# Patient Record
Sex: FEMALE | Race: WHITE | Employment: OTHER | URBAN - METROPOLITAN AREA
[De-identification: names, ages, dates, MRNs, and addresses within clinical notes are randomized per-mention and may not be internally consistent; named-entity substitution may affect disease eponyms.]

---

## 2017-07-31 ENCOUNTER — APPOINTMENT (OUTPATIENT)
Dept: CT IMAGING | Age: 82
End: 2017-07-31
Attending: EMERGENCY MEDICINE
Payer: MEDICARE

## 2017-07-31 ENCOUNTER — APPOINTMENT (OUTPATIENT)
Dept: GENERAL RADIOLOGY | Age: 82
End: 2017-07-31
Attending: EMERGENCY MEDICINE
Payer: MEDICARE

## 2017-07-31 ENCOUNTER — HOSPITAL ENCOUNTER (EMERGENCY)
Age: 82
Discharge: HOME | End: 2017-07-31
Attending: EMERGENCY MEDICINE
Payer: MEDICARE

## 2017-07-31 VITALS
SYSTOLIC BLOOD PRESSURE: 159 MMHG | BODY MASS INDEX: 29.72 KG/M2 | WEIGHT: 157.41 LBS | RESPIRATION RATE: 20 BRPM | HEIGHT: 61 IN | DIASTOLIC BLOOD PRESSURE: 60 MMHG | OXYGEN SATURATION: 98 % | HEART RATE: 76 BPM

## 2017-07-31 DIAGNOSIS — N30.00 ACUTE CYSTITIS WITHOUT HEMATURIA: Primary | ICD-10-CM

## 2017-07-31 DIAGNOSIS — E16.2 HYPOGLYCEMIA: ICD-10-CM

## 2017-07-31 LAB
ALBUMIN SERPL BCP-MCNC: 2.7 G/DL (ref 3.5–5)
ALBUMIN/GLOB SERPL: 0.7 {RATIO} (ref 1.1–2.2)
ALP SERPL-CCNC: 97 U/L (ref 45–117)
ALT SERPL-CCNC: 16 U/L (ref 12–78)
ANION GAP BLD CALC-SCNC: 6 MMOL/L (ref 5–15)
APPEARANCE UR: ABNORMAL
AST SERPL W P-5'-P-CCNC: 16 U/L (ref 15–37)
BACTERIA URNS QL MICRO: ABNORMAL /HPF
BASOPHILS # BLD AUTO: 0 K/UL (ref 0–0.1)
BASOPHILS # BLD: 0 % (ref 0–1)
BILIRUB SERPL-MCNC: 0.5 MG/DL (ref 0.2–1)
BILIRUB UR QL: NEGATIVE
BUN SERPL-MCNC: 40 MG/DL (ref 6–20)
BUN/CREAT SERPL: 21 (ref 12–20)
CALCIUM SERPL-MCNC: 8.2 MG/DL (ref 8.5–10.1)
CHLORIDE SERPL-SCNC: 101 MMOL/L (ref 97–108)
CK MB CFR SERPL CALC: 5.1 % (ref 0–2.5)
CK MB SERPL-MCNC: 5.2 NG/ML (ref 5–25)
CK SERPL-CCNC: 102 U/L (ref 26–192)
CO2 SERPL-SCNC: 30 MMOL/L (ref 21–32)
COLOR UR: ABNORMAL
CREAT SERPL-MCNC: 1.89 MG/DL (ref 0.55–1.02)
EOSINOPHIL # BLD: 0.1 K/UL (ref 0–0.4)
EOSINOPHIL NFR BLD: 1 % (ref 0–7)
EPITH CASTS URNS QL MICRO: ABNORMAL /LPF
ERYTHROCYTE [DISTWIDTH] IN BLOOD BY AUTOMATED COUNT: 17.1 % (ref 11.5–14.5)
GLOBULIN SER CALC-MCNC: 4 G/DL (ref 2–4)
GLUCOSE BLD STRIP.AUTO-MCNC: 244 MG/DL (ref 65–100)
GLUCOSE SERPL-MCNC: 243 MG/DL (ref 65–100)
GLUCOSE UR STRIP.AUTO-MCNC: NEGATIVE MG/DL
HCT VFR BLD AUTO: 31.3 % (ref 35–47)
HGB BLD-MCNC: 10 G/DL (ref 11.5–16)
HGB UR QL STRIP: ABNORMAL
HYALINE CASTS URNS QL MICRO: ABNORMAL /LPF (ref 0–5)
KETONES UR QL STRIP.AUTO: NEGATIVE MG/DL
LACTATE SERPL-SCNC: 1 MMOL/L (ref 0.4–2)
LEUKOCYTE ESTERASE UR QL STRIP.AUTO: ABNORMAL
LYMPHOCYTES # BLD AUTO: 10 % (ref 12–49)
LYMPHOCYTES # BLD: 1.9 K/UL (ref 0.8–3.5)
MCH RBC QN AUTO: 30.4 PG (ref 26–34)
MCHC RBC AUTO-ENTMCNC: 31.9 G/DL (ref 30–36.5)
MCV RBC AUTO: 95.1 FL (ref 80–99)
MONOCYTES # BLD: 1 K/UL (ref 0–1)
MONOCYTES NFR BLD AUTO: 5 % (ref 5–13)
NEUTS SEG # BLD: 15.2 K/UL (ref 1.8–8)
NEUTS SEG NFR BLD AUTO: 84 % (ref 32–75)
NITRITE UR QL STRIP.AUTO: NEGATIVE
PH UR STRIP: 7 [PH] (ref 5–8)
PLATELET # BLD AUTO: 238 K/UL (ref 150–400)
POTASSIUM SERPL-SCNC: 3.4 MMOL/L (ref 3.5–5.1)
PROT SERPL-MCNC: 6.7 G/DL (ref 6.4–8.2)
PROT UR STRIP-MCNC: ABNORMAL MG/DL
RBC # BLD AUTO: 3.29 M/UL (ref 3.8–5.2)
RBC #/AREA URNS HPF: ABNORMAL /HPF (ref 0–5)
SERVICE CMNT-IMP: ABNORMAL
SODIUM SERPL-SCNC: 137 MMOL/L (ref 136–145)
SP GR UR REFRACTOMETRY: 1.01 (ref 1–1.03)
TROPONIN I SERPL-MCNC: <0.04 NG/ML
UA: UC IF INDICATED,UAUC: ABNORMAL
UROBILINOGEN UR QL STRIP.AUTO: 0.2 EU/DL (ref 0.2–1)
WBC # BLD AUTO: 18.2 K/UL (ref 3.6–11)
WBC URNS QL MICRO: >100 /HPF (ref 0–4)

## 2017-07-31 PROCEDURE — 93005 ELECTROCARDIOGRAM TRACING: CPT

## 2017-07-31 PROCEDURE — 82962 GLUCOSE BLOOD TEST: CPT

## 2017-07-31 PROCEDURE — 80053 COMPREHEN METABOLIC PANEL: CPT | Performed by: EMERGENCY MEDICINE

## 2017-07-31 PROCEDURE — 87077 CULTURE AEROBIC IDENTIFY: CPT | Performed by: EMERGENCY MEDICINE

## 2017-07-31 PROCEDURE — 71010 XR CHEST PORT: CPT

## 2017-07-31 PROCEDURE — 87040 BLOOD CULTURE FOR BACTERIA: CPT | Performed by: EMERGENCY MEDICINE

## 2017-07-31 PROCEDURE — 85025 COMPLETE CBC W/AUTO DIFF WBC: CPT | Performed by: EMERGENCY MEDICINE

## 2017-07-31 PROCEDURE — 74011000258 HC RX REV CODE- 258: Performed by: EMERGENCY MEDICINE

## 2017-07-31 PROCEDURE — 81001 URINALYSIS AUTO W/SCOPE: CPT | Performed by: EMERGENCY MEDICINE

## 2017-07-31 PROCEDURE — 70450 CT HEAD/BRAIN W/O DYE: CPT

## 2017-07-31 PROCEDURE — 96375 TX/PRO/DX INJ NEW DRUG ADDON: CPT

## 2017-07-31 PROCEDURE — 87086 URINE CULTURE/COLONY COUNT: CPT | Performed by: EMERGENCY MEDICINE

## 2017-07-31 PROCEDURE — 82550 ASSAY OF CK (CPK): CPT | Performed by: EMERGENCY MEDICINE

## 2017-07-31 PROCEDURE — 36415 COLL VENOUS BLD VENIPUNCTURE: CPT | Performed by: EMERGENCY MEDICINE

## 2017-07-31 PROCEDURE — 96365 THER/PROPH/DIAG IV INF INIT: CPT

## 2017-07-31 PROCEDURE — 83605 ASSAY OF LACTIC ACID: CPT

## 2017-07-31 PROCEDURE — 99285 EMERGENCY DEPT VISIT HI MDM: CPT

## 2017-07-31 PROCEDURE — 87186 SC STD MICRODIL/AGAR DIL: CPT | Performed by: EMERGENCY MEDICINE

## 2017-07-31 PROCEDURE — 84484 ASSAY OF TROPONIN QUANT: CPT | Performed by: EMERGENCY MEDICINE

## 2017-07-31 PROCEDURE — 74011250636 HC RX REV CODE- 250/636: Performed by: EMERGENCY MEDICINE

## 2017-07-31 PROCEDURE — 83605 ASSAY OF LACTIC ACID: CPT | Performed by: EMERGENCY MEDICINE

## 2017-07-31 RX ORDER — SODIUM CHLORIDE 0.9 % (FLUSH) 0.9 %
5-10 SYRINGE (ML) INJECTION AS NEEDED
Status: DISCONTINUED | OUTPATIENT
Start: 2017-07-31 | End: 2017-08-01 | Stop reason: HOSPADM

## 2017-07-31 RX ORDER — SODIUM CHLORIDE 0.9 % (FLUSH) 0.9 %
5-10 SYRINGE (ML) INJECTION EVERY 8 HOURS
Status: DISCONTINUED | OUTPATIENT
Start: 2017-07-31 | End: 2017-08-01 | Stop reason: HOSPADM

## 2017-07-31 RX ORDER — CEPHALEXIN 500 MG/1
500 CAPSULE ORAL 3 TIMES DAILY
Qty: 21 CAP | Refills: 0 | Status: SHIPPED | OUTPATIENT
Start: 2017-07-31 | End: 2017-08-03

## 2017-07-31 RX ORDER — FUROSEMIDE 10 MG/ML
40 INJECTION INTRAMUSCULAR; INTRAVENOUS
Status: COMPLETED | OUTPATIENT
Start: 2017-07-31 | End: 2017-07-31

## 2017-07-31 RX ADMIN — FUROSEMIDE 40 MG: 10 INJECTION, SOLUTION INTRAMUSCULAR; INTRAVENOUS at 18:45

## 2017-07-31 RX ADMIN — CEFTRIAXONE 1 G: 1 INJECTION, POWDER, FOR SOLUTION INTRAMUSCULAR; INTRAVENOUS at 22:28

## 2017-07-31 NOTE — ED PROVIDER NOTES
HPI Comments: Kristin Viera is a 80 y.o. female who presents from [address] [place] [address] to [facility] with an altered mental status. Per EMS, pt was walking to the lunch room in her nursing facility when she acutely stopped talking or interacting with staff, prompting call for 911. EMS notes that upon arrival, pt was able to open eyes to name, but was not able to follow commands. They state that BGL was 77 mg/dL at that time, and administered 1 amp of D50, but deny any change in condition. Pt nonverbal at time of interview, clenching eyes closed. Shireen Trujillo MD    PMHx: hypothyroidism, CKD, DM, HTN, CAD, HLD  Social Hx: - smoking; - EtOH; - illicit drug use    HPI and ROS are limited secondary to altered mental status. The history is provided by the EMS personnel. The history is limited by the condition of the patient. No  was used. Past Medical History:   Diagnosis Date    CAD (coronary artery disease)     Chronic kidney disease (CKD), stage III (moderate)     GFR ~ 37 at baseline    Diabetes ([facility] [address]     Type 2 with renal manifestations    Gout     Hyperlipidemia     Hypertension     Hypothyroidism     Other ill-defined conditions(799.89)     sleep apnea    Other ill-defined conditions(799.89)     Cardiomyopathy    Syncope     Low blood sugar       Past Surgical History:   Procedure Laterality Date    CARDIAC SURG PROCEDURE UNLIST      defibrillator    HX ORTHOPAEDIC      knee surgery    HX PACEMAKER  09/23/2004    / dual lead         Family History:   Problem Relation Age of Onset    Heart Disease Father        Social History     Social History    Marital status:      Spouse name: N/A    Number of children: N/A    Years of education: N/A     Occupational History    Not on file.      Social History Main Topics    Smoking status: Never Smoker    Smokeless tobacco: Never Used    Alcohol use No    Drug use: Yes     Special: Prescription, OTC    Sexual activity: No     Other Topics Concern    Not on file     Social History Narrative         ALLERGIES: Review of patient's allergies indicates no known allergies. Review of Systems   Unable to perform ROS: Mental status change       Vitals:    07/31/17 1702   BP: 149/56   Pulse: 65   Resp: 15   SpO2: 96%   Weight: 71.4 kg (157 lb 6.5 oz)   Height: 5' 1\" (1.549 m)            Physical Exam   Constitutional: She appears well-developed and well-nourished. No distress. Elderly female no acute distres   HENT:   Head: Normocephalic and atraumatic. Mouth/Throat: Oropharynx is clear and moist. No oropharyngeal exudate. Eyes: Conjunctivae and EOM are normal. Pupils are equal, round, and reactive to light. Neck: Normal range of motion. Neck supple. No JVD present. No tracheal deviation present. Cardiovascular: Normal rate, regular rhythm, normal heart sounds and intact distal pulses. No murmur heard. Pulmonary/Chest: Effort normal and breath sounds normal. No stridor. No respiratory distress. She has no wheezes. She has no rales. She exhibits no tenderness. Abdominal: Soft. She exhibits no distension. There is no tenderness. There is no rebound and no guarding. Musculoskeletal: Normal range of motion. She exhibits no edema or tenderness. Neurological: No cranial nerve deficit. Not awake or alert, withdraws to pain   Skin: Skin is warm and dry. She is not diaphoretic. Psychiatric: She has a normal mood and affect. Her behavior is normal.   Nursing note and vitals reviewed.        MDM  Number of Diagnoses or Management Options  Diagnosis management comments: DDx: dehydration, electrolyte abnormality, hypoglycemic event, PNA, ICH, behavioral problem       Amount and/or Complexity of Data Reviewed  Clinical lab tests: ordered and reviewed  Tests in the radiology section of CPT®: ordered and reviewed  Tests in the medicine section of CPT®: ordered and reviewed  Review and summarize past medical records: yes  Independent visualization of images, tracings, or specimens: yes    Patient Progress  Patient progress: stable    ED Course       Procedures     EKG- NSR, rate 63, normal axis, pr/qrs, q waves inf/ant-septal, no acute ST-T wave changes, Namita Garibay, DO      LABORATORY TESTS:  Recent Results (from the past 12 hour(s))   GLUCOSE, POC    Collection Time: 07/31/17  4:55 PM   Result Value Ref Range    Glucose (POC) 244 (H) 65 - 100 mg/dL    Performed by [facility]    EKG, 12 LEAD, INITIAL    Collection Time: 07/31/17  5:07 PM   Result Value Ref Range    Ventricular Rate 63 BPM    Atrial Rate 63 BPM    P-R Interval 198 ms    QRS Duration 106 ms    Q-T Interval 470 ms    QTC Calculation (Bezet) 480 ms    Calculated P Axis 45 degrees    Calculated R Axis -22 degrees    Calculated T Axis 33 degrees    Diagnosis       Normal sinus rhythm  Inferior infarct (cited on or before 31-JUL-2017)  Anteroseptal infarct , age undetermined  Abnormal ECG  When compared with ECG of 02-SEP-2015 12:52,  No significant change was found     CBC WITH AUTOMATED DIFF    Collection Time: 07/31/17  5:32 PM   Result Value Ref Range    WBC 18.2 (H) 3.6 - 11.0 K/uL    RBC 3.29 (L) 3.80 - 5.20 M/uL    HGB 10.0 (L) 11.5 - 16.0 g/dL    HCT 31.3 (L) 35.0 - 47.0 %    MCV 95.1 80.0 - 99.0 FL    MCH 30.4 26.0 - 34.0 PG    MCHC 31.9 30.0 - 36.5 g/dL    RDW 17.1 (H) 11.5 - 14.5 %    PLATELET 350 186 - 424 K/uL    NEUTROPHILS 84 (H) 32 - 75 %    LYMPHOCYTES 10 (L) 12 - 49 %    MONOCYTES 5 5 - 13 %    EOSINOPHILS 1 0 - 7 %    BASOPHILS 0 0 - 1 %    ABS. NEUTROPHILS 15.2 (H) 1.8 - 8.0 K/UL    ABS. LYMPHOCYTES 1.9 0.8 - 3.5 K/UL    ABS. MONOCYTES 1.0 0.0 - 1.0 K/UL    ABS. EOSINOPHILS 0.1 0.0 - 0.4 K/UL    ABS.  BASOPHILS 0.0 0.0 - 0.1 K/UL   CK W/ CKMB & INDEX    Collection Time: 07/31/17  5:32 PM   Result Value Ref Range     26 - 192 U/L    CK - MB 5.2 (H) <3.6 NG/ML    CK-MB Index 5.1 (H) 0 - 2.5     METABOLIC PANEL, COMPREHENSIVE    Collection Time: 07/31/17  5:32 PM   Result Value Ref Range    Sodium 137 136 - 145 mmol/L    Potassium 3.4 (L) 3.5 - 5.1 mmol/L    Chloride 101 97 - 108 mmol/L    CO2 30 21 - 32 mmol/L    Anion gap 6 5 - 15 mmol/L    Glucose 243 (H) 65 - 100 mg/dL    BUN 40 (H) 6 - 20 MG/DL    Creatinine 1.89 (H) 0.55 - 1.02 MG/DL    BUN/Creatinine ratio 21 (H) 12 - 20      GFR est AA 30 (L) >60 ml/min/1.73m2    GFR est non-AA 25 (L) >60 ml/min/1.73m2    Calcium 8.2 (L) 8.5 - 10.1 MG/DL    Bilirubin, total 0.5 0.2 - 1.0 MG/DL    ALT (SGPT) 16 12 - 78 U/L    AST (SGOT) 16 15 - 37 U/L    Alk. phosphatase 97 45 - 117 U/L    Protein, total 6.7 6.4 - 8.2 g/dL    Albumin 2.7 (L) 3.5 - 5.0 g/dL    Globulin 4.0 2.0 - 4.0 g/dL    A-G Ratio 0.7 (L) 1.1 - 2.2     TROPONIN I    Collection Time: 07/31/17  5:32 PM   Result Value Ref Range    Troponin-I, Qt. <0.04 <0.05 ng/mL   URINALYSIS W/ REFLEX CULTURE    Collection Time: 07/31/17  8:12 PM   Result Value Ref Range    Color YELLOW/STRAW      Appearance CLOUDY (A) CLEAR      Specific gravity 1.010 1.003 - 1.030      pH (UA) 7.0 5.0 - 8.0      Protein TRACE (A) NEG mg/dL    Glucose NEGATIVE  NEG mg/dL    Ketone NEGATIVE  NEG mg/dL    Bilirubin NEGATIVE  NEG      Blood TRACE (A) NEG      Urobilinogen 0.2 0.2 - 1.0 EU/dL    Nitrites NEGATIVE  NEG      Leukocyte Esterase LARGE (A) NEG      WBC >100 (H) 0 - 4 /hpf    RBC 5-10 0 - 5 /hpf    Epithelial cells FEW FEW /lpf    Bacteria 1+ (A) NEG /hpf    UA:UC IF INDICATED URINE CULTURE ORDERED (A) CNI      Hyaline cast 0-2 0 - 5 /lpf   LACTIC ACID    Collection Time: 07/31/17 10:03 PM   Result Value Ref Range    Lactic acid 1.0 0.4 - 2.0 MMOL/L       IMAGING RESULTS:  CT Results  (Last 48 hours)               07/31/17 1759  CT HEAD WO CONT Final result    Narrative:  EXAM:  CT HEAD WO CONT       INDICATION:   altered mental status and hypoglycemia today       COMPARISON: 3/31/2016. CONTRAST:  None.        TECHNIQUE: Unenhanced CT of the head was performed using 5 mm images. Brain and   bone windows were generated. CT dose reduction was achieved through use of a   standardized protocol tailored for this examination and automatic exposure   control for dose modulation. FINDINGS:   The ventricles and sulci are stable in size, shape and configuration and   midline. There is mild periventricular white matter hypodensity. There is no   intracranial hemorrhage, extra-axial collection, mass, mass effect or midline   shift. The basilar cisterns are open. No acute infarct is identified. The bone   windows demonstrate no abnormalities. The visualized portions of the paranasal   sinuses and mastoid air cells are clear. Incidental note is made of 2 midline   lipomas. There is heavy calcification of both vertebral arteries. IMPRESSION:        No acute intracranial process identified by noncontrast CT. CXR Results  (Last 48 hours)               07/31/17 1728  XR CHEST PORT Final result    Narrative:  INDICATION: Chest pain. History of cardiac pacemaker. Hypoglycemia. COMPARISON: 9/9/2015       A single frontal view was obtained. The time of this study is 1717 hours. There   is pattern of interstitial and possibly early pulmonary edema. Heart size is   stable. Cardiac pacemaker is again noted. Lorene Lomas                 IMPRESSION:   Interstitial and possible early pulmonary edema             VITALS:  Patient Vitals for the past 12 hrs:   Pulse Resp BP SpO2   07/31/17 2130 61 11 155/71 99 %   07/31/17 2100 66 15 162/54 98 %   07/31/17 2030 63 13 153/62 98 %   07/31/17 1930 70 16 147/52 99 %   07/31/17 1900 68 12 163/60 99 %   07/31/17 1830 62 13 153/55 97 %   07/31/17 1815 64 13 148/53 98 %   07/31/17 1730 62 14 144/46 97 %   07/31/17 1703 68 15 - 96 %   07/31/17 1702 65 15 149/56 96 %   07/31/17 1700 - - 149/56 -       MEDICATIONS GIVEN:  Medications   sodium chloride (NS) flush 5-10 mL (not administered)   sodium chloride (NS) flush 5-10 mL (not administered)   furosemide (LASIX) injection 40 mg (40 mg IntraVENous Given 7/31/17 7775)   cefTRIAXone (ROCEPHIN) 1 g in 0.9% sodium chloride (MBP/ADV) 50 mL (1 g IntraVENous New Bag 7/31/17 2228)       IMPRESSION:  1. Acute cystitis without hematuria    2. Hypoglycemia      At time of discharge pt awake, alert, daughter at bedside, discussed admission and discharge, pt and daughter are ok with discharge planning will start AB for UTI, pt given Rocephin here in the ED. Manisha Bain,     PLAN:  1. Current Discharge Medication List      START taking these medications    Details   cephALEXin (KEFLEX) 500 mg capsule Take 1 Cap by mouth three (3) times daily for 7 days. Qty: 21 Cap, Refills: 0         CONTINUE these medications which have NOT CHANGED    Details   allopurinol (ZYLOPRIM) 100 mg tablet Take 1 Tab by mouth daily. Qty: 90 Tab, Refills: 3      furosemide (LASIX) 40 mg tablet 160 MG Q AM  Qty: 80 Tab, Refills: 1      potassium chloride SR (KLOR-CON 10) 10 mEq tablet Take 1 Tab by mouth three (3) times daily. Qty: 100 Tab, Refills: 5      carboxymethylcellulose sodium 1 % dlgl Apply  to eye two (2) times a day. Indications: DRY EYE, both eyes- 1 drop      amLODIPine (NORVASC) 5 mg tablet Take 5 mg by mouth daily. risperiDONE (RISPERDAL) 0.5 mg tablet Take 0.5 mg by mouth nightly. clopidogrel (PLAVIX) 75 mg tablet Take  by mouth daily. atorvastatin (LIPITOR) 40 mg tablet Take 40 mg by mouth daily. insulin regular (HUMULIN R) 100 unit/mL injection by SubCUTAneous route. insulin NPH (HUMULIN N) 100 unit/mL injection by SubCUTAneous route once. !! metoprolol (LOPRESSOR) 25 mg tablet Take  by mouth two (2) times a day. ranitidine (ZANTAC) 150 mg tablet Take 150 mg by mouth two (2) times a day. !! metoprolol (LOPRESSOR) 50 mg tablet Take 1 Tab by mouth two (2) times a day.   Qty: 60 Tab, Refills: 10      levothyroxine (LEVOXYL) 88 mcg tablet Take 88 mcg by mouth daily (before breakfast). calcium carbonate (OS-YOSHI 500) 500 mg (1,250 mg) tablet Take 500 mg by mouth two (2) times a day. !! - Potential duplicate medications found. Please discuss with provider. 2.   Follow-up Information     Follow up With Details [address] [place] [address] [facility]  Jorden Fernández  [phone]          3. Return to ED if worse     Discharge Note:  11:28 PM  The patient has been re-evaluated and is ready for discharge. Reviewed available results with patient. Counseled patient on diagnosis and care plan. Patient has expressed understanding, and all questions have been answered. Patient agrees with plan and agrees to follow up as recommended, or to return to the ED if their symptoms worsen. Discharge instructions have been provided and explained to the patient, along with reasons to return to the ED. This note is prepared by Gt Webb, acting as Scribe for Army Bianca DO. Army Bianca DO: The scribe's documentation has been prepared under my direction and personally reviewed by me in its entirety. I confirm that the note above accurately reflects all work, treatment, procedures, and medical decision making performed by me.

## 2017-07-31 NOTE — ED NOTES
Received pt to exam room via EMS, resting on stretcher in position of comfort, call bell given to pt; per EMS they were called to [facility] due to low blood sugar, upon arrival pt glucose was 77, she was given 1amp D50, upon arrival pt opens eyes to name call but does not speak or follow any commands, pt has purposeful movements as she holds eyes closed when attempting to assess pupils, she is forceful against lifting of her arms

## 2017-08-01 LAB
ATRIAL RATE: 63 BPM
CALCULATED P AXIS, ECG09: 45 DEGREES
CALCULATED R AXIS, ECG10: -22 DEGREES
CALCULATED T AXIS, ECG11: 33 DEGREES
DIAGNOSIS, 93000: NORMAL
LACTATE BLD-SCNC: 0.9 MMOL/L (ref 0.4–2)
P-R INTERVAL, ECG05: 198 MS
Q-T INTERVAL, ECG07: 470 MS
QRS DURATION, ECG06: 106 MS
QTC CALCULATION (BEZET), ECG08: 480 MS
VENTRICULAR RATE, ECG03: 63 BPM

## 2017-08-01 NOTE — DISCHARGE INSTRUCTIONS
Hypoglycemia: Care Instructions  Your Care Instructions  Hypoglycemia means that your blood sugar is low and your body is not getting enough fuel. Some people get low blood sugar from not eating often enough. Some medicines to treat diabetes can cause low blood sugar. People who have had surgery on their stomachs or intestines may get hypoglycemia. Problems with the pancreas, kidneys, or liver also can cause low blood sugar. A snack or drink with sugar in it will raise your blood sugar and should ease your symptoms right away. Your doctor may recommend that you change or stop your medicines until you can get your blood sugar levels under control. In the long run, you may need to change your diet and eating habits so that you get enough fuel for your body throughout the day. Follow-up care is a key part of your treatment and safety. Be sure to make and go to all appointments, and call your doctor if you are having problems. It's also a good idea to know your test results and keep a list of the medicines you take. How can you care for yourself at home? · Learn to recognize the early signs of low blood sugar. Signs include:  ¨ Nausea. ¨ Hunger. ¨ Feeling nervous, irritable, or shaky. ¨ Cold, clammy, wet skin. ¨ Sweating (when you are not exercising). ¨ A fast heartbeat. ¨ Numbness or tingling of the fingertips or lips. · If you feel an episode of low blood sugar coming on, drink fruit juice or sugared (not diet) soda, or eat sugar in the form of candy, cubes, or tablets. [employer] are another [employer]. · Eat small, frequent meals so that you do not get too hungry between meals. · Balance extra exercise with eating more. · Keep a written record of your low blood sugar episodes, including when you last ate and what you ate, so that you can learn what causes your blood sugar to drop.   · Make sure your family, friends, and coworkers know the symptoms of low blood sugar and know what to do to get your sugar level up. · Wear medical alert jewelry that lists your condition. You can buy this at most drugstores. When should you call for help? Call 911 anytime you think you may need emergency care. For example, call if:  · You passed out (lost consciousness). · You are confused or cannot think clearly. · Your blood sugar is very high or very low. Watch closely for changes in your health, and be sure to contact your doctor if:  · Your blood sugar stays outside the level your doctor set for you. · You have any problems. Where can you learn more? Go to http://breanaProdea Systemsevan.info/. Enter Y476 in the search box to learn more about \"Hypoglycemia: Care Instructions. \"  Current as of: March 13, 2017  Content Version: 11.3  © 5612-9431 [employer]. Care instructions adapted under license by [employer] (which disclaims liability or warranty for this information). If you have questions about a medical condition or this instruction, always ask your healthcare professional. [place] [address] any warranty or liability for your use of this information. Urinary Tract Infection in Women: Care Instructions  Your Care Instructions    A urinary tract infection, or UTI, is a general term for an infection anywhere between the kidneys and the urethra (where urine comes out). Most UTIs are bladder infections. They often cause pain or burning when you urinate. UTIs are caused by bacteria and can be cured with antibiotics. Be sure to complete your treatment so that the infection goes away. Follow-up care is a key part of your treatment and safety. Be sure to make and go to all appointments, and call your doctor if you are having problems. It's also a good idea to know your test results and keep a list of the medicines you take. How can you care for yourself at home? · Take your antibiotics as directed. Do not stop taking them just because you feel better.  You need to take the full course of antibiotics. · Drink extra water and other fluids for the next day or two. This may help wash out the bacteria that are causing the infection. (If you have kidney, heart, or liver disease and have to limit fluids, talk with your doctor before you increase your fluid intake.)  · Avoid drinks that are carbonated or have caffeine. They can irritate the bladder. · Urinate often. Try to empty your bladder each time. · To relieve pain, take a hot bath or lay a heating pad set on low over your lower belly or genital area. Never go to sleep with a heating pad in place. To prevent UTIs  · Drink plenty of water each day. This helps you urinate often, which clears bacteria from your system. (If you have kidney, heart, or liver disease and have to limit fluids, talk with your doctor before you increase your fluid intake.)  · Urinate when you need to. · Urinate right after you have sex. · Change sanitary pads often. · Avoid douches, bubble baths, feminine hygiene sprays, and other feminine hygiene products that have deodorants. · After going to the bathroom, wipe from front to back. When should you call for help? Call your doctor now or seek immediate medical care if:  · Symptoms such as fever, chills, nausea, or vomiting get worse or appear for the first time. · You have new pain in your back just below your rib cage. This is called flank pain. · There is new blood or pus in your urine. · You have any problems with your antibiotic medicine. Watch closely for changes in your health, and be sure to contact your doctor if:  · You are not getting better after taking an antibiotic for 2 days. · Your symptoms go away but then come back. Where can you learn more? Go to http://breana-evan.info/. Enter T951 in the search box to learn more about \"Urinary Tract Infection in Women: Care Instructions. \"  Current as of: November 28, 2016  Content Version: 11.3  © 5439-1925 [employer]. Care instructions adapted under license by [employer] (which disclaims liability or warranty for this information). If you have questions about a medical condition or this instruction, always ask your healthcare professional. [address] any warranty or liability for your use of this information.

## 2017-08-01 NOTE — ED NOTES
TRANSFER - OUT REPORT:    Verbal report given to melisa  (name) on Kristan Connell  being transferred back to [facility](unit)     Report consisted of patients Situation, Background, Assessment and   Recommendations(SBAR). Information from the following report(s) ED Summary was reviewed with the receiving nurse. Lines:   Peripheral IV 07/31/17 Right Wrist (Active)   Site Assessment Clean, dry, & intact 7/31/2017  5:33 PM        Opportunity for questions and clarification was provided.       Patient transported with niece back to [facility] in [place] [address]

## 2017-08-03 LAB
BACTERIA SPEC CULT: ABNORMAL
CC UR VC: ABNORMAL
SERVICE CMNT-IMP: ABNORMAL

## 2017-08-03 RX ORDER — CEFDINIR 300 MG/1
300 CAPSULE ORAL DAILY
Qty: 7 CAP | Refills: 0 | Status: SHIPPED | OUTPATIENT
Start: 2017-08-03 | End: 2017-08-10

## 2017-08-03 NOTE — PROGRESS NOTES
Spoke with Varsha Colvin at [facility] Advised to fax script and urine culture to [phone]. Advised to stop Keflex, start Cefdinir.    Caridad Frank

## 2017-08-05 LAB
BACTERIA SPEC CULT: NORMAL
SERVICE CMNT-IMP: NORMAL

## 2017-12-12 ENCOUNTER — APPOINTMENT (OUTPATIENT)
Dept: GENERAL RADIOLOGY | Age: 82
End: 2017-12-12
Attending: EMERGENCY MEDICINE
Payer: MEDICARE

## 2017-12-12 ENCOUNTER — APPOINTMENT (OUTPATIENT)
Dept: CT IMAGING | Age: 82
End: 2017-12-12
Attending: EMERGENCY MEDICINE
Payer: MEDICARE

## 2017-12-12 ENCOUNTER — HOSPITAL ENCOUNTER (EMERGENCY)
Age: 82
Discharge: HOME | End: 2017-12-12
Attending: EMERGENCY MEDICINE
Payer: MEDICARE

## 2017-12-12 VITALS
TEMPERATURE: 98.4 F | RESPIRATION RATE: 16 BRPM | WEIGHT: 150 LBS | HEART RATE: 89 BPM | OXYGEN SATURATION: 95 % | SYSTOLIC BLOOD PRESSURE: 151 MMHG | DIASTOLIC BLOOD PRESSURE: 56 MMHG | BODY MASS INDEX: 28.34 KG/M2

## 2017-12-12 DIAGNOSIS — S42.215G CLOSED NONDISPLACED FRACTURE OF SURGICAL NECK OF LEFT HUMERUS WITH DELAYED HEALING, UNSPECIFIED FRACTURE MORPHOLOGY, SUBSEQUENT ENCOUNTER: ICD-10-CM
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
## 2017-12-12 DIAGNOSIS — S22.42XA CLOSED FRACTURE OF MULTIPLE RIBS OF LEFT SIDE, INITIAL ENCOUNTER: ICD-10-CM

## 2017-12-12 DIAGNOSIS — N30.00 ACUTE CYSTITIS WITHOUT HEMATURIA: ICD-10-CM

## 2017-12-12 LAB
ABO + RH BLD: NORMAL
ALBUMIN SERPL-MCNC: 2.9 G/DL (ref 3.5–5)
ALBUMIN/GLOB SERPL: 0.6 {RATIO} (ref 1.1–2.2)
ALP SERPL-CCNC: 115 U/L (ref 45–117)
ALT SERPL-CCNC: 13 U/L (ref 12–78)
ANION GAP SERPL CALC-SCNC: 7 MMOL/L (ref 5–15)
APPEARANCE UR: ABNORMAL
AST SERPL-CCNC: 14 U/L (ref 15–37)
ATRIAL RATE: 89 BPM
BACTERIA URNS QL MICRO: ABNORMAL /HPF
BASOPHILS # BLD: 0 K/UL (ref 0–0.1)
BASOPHILS NFR BLD: 0 % (ref 0–1)
BILIRUB SERPL-MCNC: 0.5 MG/DL (ref 0.2–1)
BILIRUB UR QL: NEGATIVE
BLOOD GROUP ANTIBODIES SERPL: NORMAL
BUN SERPL-MCNC: 36 MG/DL (ref 6–20)
BUN/CREAT SERPL: 17 (ref 12–20)
CALCIUM SERPL-MCNC: 8.8 MG/DL (ref 8.5–10.1)
CALCULATED P AXIS, ECG09: 59 DEGREES
CALCULATED R AXIS, ECG10: -39 DEGREES
CALCULATED T AXIS, ECG11: 76 DEGREES
CHLORIDE SERPL-SCNC: 104 MMOL/L (ref 97–108)
CO2 SERPL-SCNC: 31 MMOL/L (ref 21–32)
COLOR UR: ABNORMAL
CREAT SERPL-MCNC: 2.18 MG/DL (ref 0.55–1.02)
DIAGNOSIS, 93000: NORMAL
EOSINOPHIL # BLD: 0.1 K/UL (ref 0–0.4)
EOSINOPHIL NFR BLD: 1 % (ref 0–7)
EPITH CASTS URNS QL MICRO: ABNORMAL /LPF
ERYTHROCYTE [DISTWIDTH] IN BLOOD BY AUTOMATED COUNT: 16.1 % (ref 11.5–14.5)
GLOBULIN SER CALC-MCNC: 4.9 G/DL (ref 2–4)
GLUCOSE SERPL-MCNC: 117 MG/DL (ref 65–100)
GLUCOSE UR STRIP.AUTO-MCNC: NEGATIVE MG/DL
HCT VFR BLD AUTO: 33.4 % (ref 35–47)
HGB BLD-MCNC: 10.7 G/DL (ref 11.5–16)
HGB UR QL STRIP: ABNORMAL
INR PPP: 1 (ref 0.9–1.1)
KETONES UR QL STRIP.AUTO: NEGATIVE MG/DL
LACTATE SERPL-SCNC: 1.3 MMOL/L (ref 0.4–2)
LEUKOCYTE ESTERASE UR QL STRIP.AUTO: ABNORMAL
LIPASE SERPL-CCNC: 74 U/L (ref 73–393)
LYMPHOCYTES # BLD: 2.6 K/UL (ref 0.8–3.5)
LYMPHOCYTES NFR BLD: 27 % (ref 12–49)
MCH RBC QN AUTO: 30.4 PG (ref 26–34)
MCHC RBC AUTO-ENTMCNC: 32 G/DL (ref 30–36.5)
MCV RBC AUTO: 94.9 FL (ref 80–99)
MONOCYTES # BLD: 1 K/UL (ref 0–1)
MONOCYTES NFR BLD: 10 % (ref 5–13)
NEUTS SEG # BLD: 6.2 K/UL (ref 1.8–8)
NEUTS SEG NFR BLD: 62 % (ref 32–75)
NITRITE UR QL STRIP.AUTO: NEGATIVE
P-R INTERVAL, ECG05: 194 MS
PH UR STRIP: 6 [PH] (ref 5–8)
PLATELET # BLD AUTO: 288 K/UL (ref 150–400)
POTASSIUM SERPL-SCNC: 3.9 MMOL/L (ref 3.5–5.1)
PROT SERPL-MCNC: 7.8 G/DL (ref 6.4–8.2)
PROT UR STRIP-MCNC: 30 MG/DL
PROTHROMBIN TIME: 10.2 SEC (ref 9–11.1)
Q-T INTERVAL, ECG07: 370 MS
QRS DURATION, ECG06: 102 MS
QTC CALCULATION (BEZET), ECG08: 450 MS
RBC # BLD AUTO: 3.52 M/UL (ref 3.8–5.2)
RBC #/AREA URNS HPF: ABNORMAL /HPF (ref 0–5)
SODIUM SERPL-SCNC: 142 MMOL/L (ref 136–145)
SP GR UR REFRACTOMETRY: 1.01 (ref 1–1.03)
SPECIMEN EXP DATE BLD: NORMAL
UROBILINOGEN UR QL STRIP.AUTO: 0.2 EU/DL (ref 0.2–1)
VENTRICULAR RATE, ECG03: 89 BPM
WBC # BLD AUTO: 9.8 K/UL (ref 3.6–11)
WBC URNS QL MICRO: >100 /HPF (ref 0–4)

## 2017-12-12 PROCEDURE — 74011000250 HC RX REV CODE- 250: Performed by: EMERGENCY MEDICINE

## 2017-12-12 PROCEDURE — 73060 X-RAY EXAM OF HUMERUS: CPT

## 2017-12-12 PROCEDURE — 96361 HYDRATE IV INFUSION ADD-ON: CPT

## 2017-12-12 PROCEDURE — 83690 ASSAY OF LIPASE: CPT

## 2017-12-12 PROCEDURE — 73562 X-RAY EXAM OF KNEE 3: CPT

## 2017-12-12 PROCEDURE — 86900 BLOOD TYPING SEROLOGIC ABO: CPT

## 2017-12-12 PROCEDURE — 85025 COMPLETE CBC W/AUTO DIFF WBC: CPT

## 2017-12-12 PROCEDURE — 96376 TX/PRO/DX INJ SAME DRUG ADON: CPT

## 2017-12-12 PROCEDURE — L0172 CERV COL SR FOAM 2PC PRE OTS: HCPCS

## 2017-12-12 PROCEDURE — 71010 XR CHEST PORT: CPT

## 2017-12-12 PROCEDURE — 96375 TX/PRO/DX INJ NEW DRUG ADDON: CPT

## 2017-12-12 PROCEDURE — 73590 X-RAY EXAM OF LOWER LEG: CPT

## 2017-12-12 PROCEDURE — 72125 CT NECK SPINE W/O DYE: CPT

## 2017-12-12 PROCEDURE — 96374 THER/PROPH/DIAG INJ IV PUSH: CPT

## 2017-12-12 PROCEDURE — 81001 URINALYSIS AUTO W/SCOPE: CPT

## 2017-12-12 PROCEDURE — 74011250637 HC RX REV CODE- 250/637: Performed by: EMERGENCY MEDICINE

## 2017-12-12 PROCEDURE — 74176 CT ABD & PELVIS W/O CONTRAST: CPT

## 2017-12-12 PROCEDURE — 85610 PROTHROMBIN TIME: CPT

## 2017-12-12 PROCEDURE — 71250 CT THORAX DX C-: CPT

## 2017-12-12 PROCEDURE — 93005 ELECTROCARDIOGRAM TRACING: CPT

## 2017-12-12 PROCEDURE — 99285 EMERGENCY DEPT VISIT HI MDM: CPT

## 2017-12-12 PROCEDURE — 70450 CT HEAD/BRAIN W/O DYE: CPT

## 2017-12-12 PROCEDURE — 80053 COMPREHEN METABOLIC PANEL: CPT

## 2017-12-12 PROCEDURE — 74011250636 HC RX REV CODE- 250/636: Performed by: EMERGENCY MEDICINE

## 2017-12-12 PROCEDURE — 83605 ASSAY OF LACTIC ACID: CPT

## 2017-12-12 PROCEDURE — 36415 COLL VENOUS BLD VENIPUNCTURE: CPT

## 2017-12-12 RX ORDER — MORPHINE SULFATE 2 MG/ML
2 INJECTION, SOLUTION INTRAMUSCULAR; INTRAVENOUS
Status: COMPLETED | OUTPATIENT
Start: 2017-12-12 | End: 2017-12-12

## 2017-12-12 RX ORDER — HALOPERIDOL 5 MG/ML
2.5 INJECTION INTRAMUSCULAR ONCE
Status: COMPLETED | OUTPATIENT
Start: 2017-12-12 | End: 2017-12-12

## 2017-12-12 RX ORDER — LIDOCAINE 50 MG/G
1 PATCH TOPICAL EVERY 24 HOURS
Status: DISCONTINUED | OUTPATIENT
Start: 2017-12-12 | End: 2017-12-12 | Stop reason: HOSPADM

## 2017-12-12 RX ORDER — FENTANYL CITRATE 50 UG/ML
50 INJECTION, SOLUTION INTRAMUSCULAR; INTRAVENOUS ONCE
Status: DISCONTINUED | OUTPATIENT
Start: 2017-12-12 | End: 2017-12-12

## 2017-12-12 RX ORDER — SODIUM CHLORIDE 9 MG/ML
1000 INJECTION, SOLUTION INTRAVENOUS ONCE
Status: COMPLETED | OUTPATIENT
Start: 2017-12-12 | End: 2017-12-12

## 2017-12-12 RX ORDER — HALOPERIDOL 5 MG/ML
1 INJECTION INTRAMUSCULAR ONCE
Status: COMPLETED | OUTPATIENT
Start: 2017-12-12 | End: 2017-12-12

## 2017-12-12 RX ORDER — BUMETANIDE 0.25 MG/ML
1 INJECTION INTRAMUSCULAR; INTRAVENOUS ONCE
Status: COMPLETED | OUTPATIENT
Start: 2017-12-12 | End: 2017-12-12

## 2017-12-12 RX ORDER — CEFDINIR 300 MG/1
300 CAPSULE ORAL DAILY
Qty: 5 CAP | Refills: 0 | Status: SHIPPED | OUTPATIENT
Start: 2017-12-12 | End: 2017-12-29

## 2017-12-12 RX ADMIN — BUMETANIDE 1 MG: 0.25 INJECTION INTRAMUSCULAR; INTRAVENOUS at 16:43

## 2017-12-12 RX ADMIN — HALOPERIDOL LACTATE 2.5 MG: 5 INJECTION, SOLUTION INTRAMUSCULAR at 15:09

## 2017-12-12 RX ADMIN — MORPHINE SULFATE 2 MG: 2 INJECTION, SOLUTION INTRAMUSCULAR; INTRAVENOUS at 12:05

## 2017-12-12 RX ADMIN — HALOPERIDOL LACTATE 1 MG: 5 INJECTION, SOLUTION INTRAMUSCULAR at 12:58

## 2017-12-12 RX ADMIN — SODIUM CHLORIDE 1000 ML: 900 INJECTION, SOLUTION INTRAVENOUS at 12:05

## 2017-12-12 NOTE — ED PROVIDER NOTES
EMERGENCY DEPARTMENT HISTORY AND PHYSICAL EXAM      Date: 12/12/2017  Patient Name: Rosana Cardona    History of Presenting Illness     Chief Complaint   Patient presents with   [place] [facility]     Fall at [facility] w/ fx of L shoulder and 3 L ribs per EMS       History Provided By: EMS    HPI: Rosana Cardona, 80 y.o. female with PMHx significant for dementia, HTN, DM, CAD, AICD, presents via EMS to the ED with cc of humerus fracture, left and left rib fractures. Per EMS report received from [address] [place] [address] patient had unwitnessed fall 12/10/17, bruising to right eye later noted, then pt c/o pain 12/11/17 so mobile imaging came out and report read that night at 2309, on 12/12/17 results noted to be left humerus fracture and left 6-8 rib fractures, left hip xray normal, no other imaging or labs obtained. No other history available at this time. On chart review, patient came in 2015 after fall and had a left humerus fracture. PCP: Woody Ornelas MD    There are no other complaints, changes, or physical findings at this time. Current Facility-Administered Medications   Medication Dose Route Frequency Provider Last Rate Last Dose    lidocaine (LIDODERM) 5 % patch 1 Patch  1 Patch TransDERmal Q24H Nisa Nash MD   1 Patch at 12/12/17 1627    bumetanide (BUMEX) injection 1 mg  1 mg IntraVENous ONCE Nisa Nash MD         Current Outpatient Prescriptions   Medication Sig Dispense Refill    cefdinir (OMNICEF) 300 mg capsule Take 1 Cap by mouth daily. Indications: UTI 5 Cap 0    lidocaine-emollient cmb no. 102 5 % PaCr 1 Patch by Apply Externally route daily. Indications: apply to left 4/5 rib area 10 Each 0    allopurinol (ZYLOPRIM) 100 mg tablet Take 1 Tab by mouth daily. 90 Tab 3    furosemide (LASIX) 40 mg tablet 160 MG Q AM (Patient taking differently: 80 mg two (2) times a day. 160 MG Q AM) 80 Tab 1    amLODIPine (NORVASC) 5 mg tablet Take 5 mg by mouth daily.       clopidogrel (PLAVIX) 75 mg tablet Take  by mouth daily.  atorvastatin (LIPITOR) 40 mg tablet Take 40 mg by mouth daily.  insulin regular (HUMULIN R) 100 unit/mL injection by SubCUTAneous route.  insulin NPH (HUMULIN N) 100 unit/mL injection by SubCUTAneous route once.  ranitidine (ZANTAC) 150 mg tablet Take 150 mg by mouth two (2) times a day.  levothyroxine (LEVOXYL) 88 mcg tablet Take 88 mcg by mouth daily (before breakfast).  potassium chloride SR (KLOR-CON 10) 10 mEq tablet Take 1 Tab by mouth three (3) times daily. 100 Tab 5    metoprolol (LOPRESSOR) 50 mg tablet Take 1 Tab by mouth two (2) times a day. (Patient taking differently: Take 50 mg by mouth daily. Indications: 75mg) 61 Tab 10       Past History     Past Medical History:  Past Medical History:   Diagnosis Date    CAD (coronary artery disease)     Chronic kidney disease (CKD), stage III (moderate)     GFR ~ 40 at baseline    Dementia     Diabetes ([facility] [address]     Type 2 with renal manifestations    Gout     Hyperlipidemia     Hypertension     Hypothyroidism     Other ill-defined conditions(799.89)     sleep apnea    Other ill-defined conditions(799.89)     Cardiomyopathy    Syncope     Low blood sugar       Past Surgical History:  Past Surgical History:   Procedure Laterality Date    CARDIAC SURG PROCEDURE UNLIST      defibrillator    HX ORTHOPAEDIC      knee surgery    HX PACEMAKER  09/23/2004    / dual lead       Family History:  Family History   Problem Relation Age of Onset    Heart Disease Father        Social History:  Social History   Substance Use Topics    Smoking status: Never Smoker    Smokeless tobacco: Never Used    Alcohol use No       Allergies:  No Known Allergies      Review of Systems   Review of Systems   Unable to perform ROS: Dementia       Physical Exam   Physical Exam   Constitutional:   Laying on right side, moaning in pain with movement     HENT:   Head: Normocephalic.    Right Ear: External ear normal.   Left Ear: External ear normal.   Nose: Nose normal.   Mouth/Throat: Oropharynx is clear and moist.   Right eye with periorbital ecchymoses, glasses on     Eyes: Conjunctivae and EOM are normal. Pupils are equal, round, and reactive to light. Ecchymoses over right eyelid and lateral portion of eye     Neck: Normal range of motion. Neck supple. No midline tenderness or stepoffs   Cardiovascular: Normal rate, regular rhythm, normal heart sounds and intact distal pulses. Exam reveals no gallop and no friction rub. No murmur heard. Pulmonary/Chest: Effort normal. No respiratory distress. She has no wheezes. She has no rales. Abdominal: Bowel sounds are normal. She exhibits distension. There is tenderness. Tense, moderately distended, scattered yellow ecchymoses, moaning in pain to palpation throughout     Musculoskeletal:   TTP over left shoulder, pain with abduction, able to fully range from elbow distally, no open fractures; right knee with ecchymoses in middle and medial portion and TTP over same area mild edema, unable to flex, unable to fully extend, not TTP over distal tib/fib or ankle/foot; pelvis stable, no tenderness over RUE or LLE   Neurological: She is alert. Not following commands, unable to assess sensation, tone is increased in RLE but otherwise normal throughout   Skin: Skin is warm and dry. Right eye periorbital ecchymoses, scattered yellow ecchymoses on abdomen, superficial horizontal linear abrasions to mid lower back, ecchymoses of right knee   Psychiatric:   Not following commands, not oriented to self, place   Nursing note and vitals reviewed.         Diagnostic Study Results     Labs -  Recent Results (from the past 12 hour(s))   EKG, 12 LEAD, INITIAL    Collection Time: 12/12/17 11:24 AM   Result Value Ref Range    Ventricular Rate 89 BPM    Atrial Rate 89 BPM    P-R Interval 194 ms    QRS Duration 102 ms    Q-T Interval 370 ms    QTC Calculation (Galina) 450 ms    Calculated P Axis 59 degrees    Calculated R Axis -39 degrees    Calculated T Axis 76 degrees    Diagnosis       Normal sinus rhythm  Left axis deviation  Poor R-wave Progression (consider lead placement or loss of anterior forces)  Confirmed by Senthil Harris ([id number]) on 12/12/2017 7:11:94 PM     METABOLIC PANEL, COMPREHENSIVE    Collection Time: 12/12/17 12:06 PM   Result Value Ref Range    Sodium 142 136 - 145 mmol/L    Potassium 3.9 3.5 - 5.1 mmol/L    Chloride 104 97 - 108 mmol/L    CO2 31 21 - 32 mmol/L    Anion gap 7 5 - 15 mmol/L    Glucose 117 (H) 65 - 100 mg/dL    BUN 36 (H) 6 - 20 MG/DL    Creatinine 2.18 (H) 0.55 - 1.02 MG/DL    BUN/Creatinine ratio 17 12 - 20      GFR est AA 26 (L) >60 ml/min/1.73m2    GFR est non-AA 21 (L) >60 ml/min/1.73m2    Calcium 8.8 8.5 - 10.1 MG/DL    Bilirubin, total 0.5 0.2 - 1.0 MG/DL    ALT (SGPT) 13 12 - 78 U/L    AST (SGOT) 14 (L) 15 - 37 U/L    Alk. phosphatase 115 45 - 117 U/L    Protein, total 7.8 6.4 - 8.2 g/dL    Albumin 2.9 (L) 3.5 - 5.0 g/dL    Globulin 4.9 (H) 2.0 - 4.0 g/dL    A-G Ratio 0.6 (L) 1.1 - 2.2     PROTHROMBIN TIME + INR    Collection Time: 12/12/17 12:06 PM   Result Value Ref Range    INR 1.0 0.9 - 1.1      Prothrombin time 10.2 9.0 - 11.1 sec   CBC WITH AUTOMATED DIFF    Collection Time: 12/12/17 12:06 PM   Result Value Ref Range    WBC 9.8 3.6 - 11.0 K/uL    RBC 3.52 (L) 3.80 - 5.20 M/uL    HGB 10.7 (L) 11.5 - 16.0 g/dL    HCT 33.4 (L) 35.0 - 47.0 %    MCV 94.9 80.0 - 99.0 FL    MCH 30.4 26.0 - 34.0 PG    MCHC 32.0 30.0 - 36.5 g/dL    RDW 16.1 (H) 11.5 - 14.5 %    PLATELET 980 216 - 799 K/uL    NEUTROPHILS 62 32 - 75 %    LYMPHOCYTES 27 12 - 49 %    MONOCYTES 10 5 - 13 %    EOSINOPHILS 1 0 - 7 %    BASOPHILS 0 0 - 1 %    ABS. NEUTROPHILS 6.2 1.8 - 8.0 K/UL    ABS. LYMPHOCYTES 2.6 0.8 - 3.5 K/UL    ABS. MONOCYTES 1.0 0.0 - 1.0 K/UL    ABS. EOSINOPHILS 0.1 0.0 - 0.4 K/UL    ABS.  BASOPHILS 0.0 0.0 - 0.1 K/UL   TYPE & SCREEN    Collection Time: 12/12/17 12:06 PM   Result Value Ref Range    Crossmatch Expiration 12/15/2017     ABO/Rh(D) A POSITIVE     Antibody screen NEG    LIPASE    Collection Time: 12/12/17 12:06 PM   Result Value Ref Range    Lipase 74 73 - 393 U/L   LACTIC ACID    Collection Time: 12/12/17 12:06 PM   Result Value Ref Range    Lactic acid 1.3 0.4 - 2.0 MMOL/L   URINALYSIS W/ RFLX MICROSCOPIC    Collection Time: 12/12/17  2:57 PM   Result Value Ref Range    Color YELLOW/STRAW      Appearance HAZY (A) CLEAR      Specific gravity 1.010 1.003 - 1.030      pH (UA) 6.0 5.0 - 8.0      Protein 30 (A) NEG mg/dL    Glucose NEGATIVE  NEG mg/dL    Ketone NEGATIVE  NEG mg/dL    Bilirubin NEGATIVE  NEG      Blood LARGE (A) NEG      Urobilinogen 0.2 0.2 - 1.0 EU/dL    Nitrites NEGATIVE  NEG      Leukocyte Esterase LARGE (A) NEG     URINE MICROSCOPIC ONLY    Collection Time: 12/12/17  2:57 PM   Result Value Ref Range    WBC >100 (H) 0 - 4 /hpf    RBC  0 - 5 /hpf    Epithelial cells MODERATE (A) FEW /lpf    Bacteria 4+ (A) NEG /hpf       Radiologic Studies -   Result Information      Status Provider Status        Final result (Exam End: 12/12/2017  1:59 PM) Open        Study Result      EXAM:  XR TIB/FIB RT     INDICATION:  eval for fracture s/p fall.     COMPARISON: None.     FINDINGS: AP and lateral  views of the right tibia and fibula demonstrate no  fracture or other acute osseous, articular or soft tissue abnormality. The  bones are osteopenic.      IMPRESSION  IMPRESSION: No acute abnormality.        Result Information      Status Provider Status        Final result (Exam End: 12/12/2017  1:58 PM) Open        Study Result      EXAM:  XR KNEE RT 3 V     INDICATION:   eval for fracture s/p fall.     COMPARISON: None.     FINDINGS: Three views of the right knee demonstrate no fracture or other acute  osseous or articular abnormality. There is no effusion.   The bones are  osteopenic.     IMPRESSION  IMPRESSION:  No acute abnormality.              Study Result      Chest portable AP     History: Evaluate for left-sided rib fractures. Known humeral fracture.     Comparison: 7/31/2017.     Findings:  A left subclavian pacemaker overlies the left mid chest which  partially obscures this area. The heart is moderately enlarged, but unchanged. There is unchanged mild pulmonary edema. No focal consolidation, pleural  effusion, or pneumothorax. The bones are osteopenic. No definite rib fracture  identified.     IMPRESSION  Impression:  Unchanged cardiomegaly and mild edema. Result Information      Status Provider Status        Final result (Exam End: 12/12/2017  3:25 PM) Open        Study Result      EXAM:  CT CERVICAL SPINE WITHOUT CONTRAST     INDICATION:   Recent trauma, spine. Fall.     COMPARISON: None.     CONTRAST:  None.     TECHNIQUE: Multislice helical CT of the cervical spine was performed without  intravenous contrast administration. Sagittal and coronal reconstructions were  generated. CT dose reduction was achieved through use of a standardized  protocol tailored for this examination and automatic exposure control for dose  modulation.      FINDINGS:     The alignment is within normal limits. There is no fracture or subluxation. There is a chronic ossicle above the odontoid process. The craniocervical  junction is within normal limits. The prevertebral soft tissues are within  normal limits.     C2-C3:  There is no spinal canal or neural foraminal stenosis.     C3-C4:  Mild disc space narrowing. Mild right paracentral disc bulge causing  significant impingement on the right lateral recess and overall mild to moderate  spinal stenosis there is moderate right and mild left neural foraminal  narrowing. .     C4-C5:  Severe disc space narrowing with partial fusion. No significant spinal  stenosis. Moderate to severe bilateral neural foraminal narrowing. .     C5-C6:  Mild/moderate right neural foraminal narrowing.  No significant left  neural foraminal narrowing or spinal stenosis. .     C6-C7:  There is no spinal canal or neural foraminal stenosis.     C7-T1:  There is no spinal canal or neural foraminal stenosis.     IMPRESSION  IMPRESSION:  Multilevel spondylosis as above. No evidence of acute process. Result Information      Status Provider Status        Final result (Exam End: 12/12/2017  3:25 PM) Open        Study Result      INDICATION:   Fall with acute head trauma      EXAM:  HEAD CT WITHOUT CONTRAST     COMPARISON:  7/31/2017     TECHNIQUE:  Routine noncontrast axial head CT was performed. Coronal and  sagittal multiplanar reconstructions were obtained. CT dose reduction was  achieved through use of a standardized protocol tailored for this examination  and automatic exposure control for dose modulation.      FINDINGS:     The ventricles and cortical sulci are within normal limits.     No evidence for acute intracranial hemorrhage, midline shift, or mass effect. Mild bilateral subcortical and periventricular areas of patchy low attenuation  is nonspecific but likely related to changes of chronic small vessel ischemic  disease.     The basal cisterns are patent.     The visualized paranasal sinuses and mastoid air cells are clear.     No calvarial abnormality.     IMPRESSION  IMPRESSION:     No evidence for acute intracranial abnormality        Result Information      Status Provider Status        Final result (Exam End: 12/12/2017  3:25 PM) Open        Study Result      INDICATION: eval for contusion s/p fall, eval for fractures s/p fall     COMPARISON: Chest radiographs same day     TECHNIQUE:  5 mm axial images were obtained through the chest, abdomen, and  pelvis. No IV contrast was administered. Oral contrast was not administered. Coronal and sagittal reconstructions were generated.  CT dose reduction was  achieved through use of a standardized protocol tailored for this examination  and automatic exposure control for dose modulation.     FINDINGS:     A left subclavian pacemaker is in place. THYROID: No nodule. MEDIASTINUM: There is a 1.0 cm left AP window node which is likely reactive. There is a 1.1 cm right lower paratracheal node. There are several partially  calcified subcarinal nodes. These are likely related to prior granulomatous  disease. ELAN: No mass or lymphadenopathy. THORACIC AORTA: No aneurysm. Extensive atherosclerotic disease. MAIN PULMONARY ARTERY: Normal in caliber. TRACHEA/BRONCHI: Patent. ESOPHAGUS: No wall thickening or dilatation. HEART: Mildly enlarged. Coronary atherosclerotic calcifications are present. PLEURA: There are small bilateral pleural effusions. LUNGS: There is diffuse groundglass opacity through the lungs with septal  thickening likely related to a combination of atelectasis and edema. Bands of  atelectasis are seen in both lower lobes associated with a small pleural  effusions. No evidence of a mass. LIVER: No mass. No hepatic steatosis. GALLBLADDER: The patient is status post cholecystectomy. The common bile duct  measures 10 mm, which may be a combination of the patient's age and postsurgical  change. SPLEEN: No mass. PANCREAS: No mass or ductal dilatation. ADRENALS: Unremarkable. KIDNEYS: No mass, calculus, or hydronephrosis. There is renal cortical thinning  likely related to chronic medical renal disease. STOMACH: Moderate hiatal hernia. SMALL BOWEL: No dilatation or wall thickening. COLON: No dilatation or wall thickening. APPENDIX: Unremarkable. PERITONEUM: No ascites or pneumoperitoneum. RETROPERITONEUM: No lymphadenopathy or aortic aneurysm. REPRODUCTIVE ORGANS: Status post hysterectomy. URINARY BLADDER: No mass or calculus. The bladder is distended. BONES: There is chronic healed posterior mild deformity of the proximal left  humerus. There are nondisplaced anterior left fourth and fifth rib fractures.   ADDITIONAL COMMENTS: N/A     IMPRESSION  IMPRESSION:     1. Acute nondisplaced anterior left fourth and fifth rib fractures. 2. Chronic healed posttraumatic deformity of the proximal left humerus. 3. Mild cardiomegaly with mild edema and small bilateral pleural effusions. 4. Status post cholecystectomy. 5. Renal cortical thinning likely related to chronic medical renal disease. 6. Moderate hiatal hernia. 7. Status post hysterectomy.         Result Information      Status Provider Status        Final result (Exam End: 12/12/2017  3:25 PM) Open        Study Result      INDICATION: eval for contusion s/p fall, eval for fractures s/p fall     COMPARISON: Chest radiographs same day     TECHNIQUE:  5 mm axial images were obtained through the chest, abdomen, and  pelvis. No IV contrast was administered. Oral contrast was not administered. Coronal and sagittal reconstructions were generated. CT dose reduction was  achieved through use of a standardized protocol tailored for this examination  and automatic exposure control for dose modulation.     FINDINGS:     A left subclavian pacemaker is in place. THYROID: No nodule. MEDIASTINUM: There is a 1.0 cm left AP window node which is likely reactive. There is a 1.1 cm right lower paratracheal node. There are several partially  calcified subcarinal nodes. These are likely related to prior granulomatous  disease. ELAN: No mass or lymphadenopathy. THORACIC AORTA: No aneurysm. Extensive atherosclerotic disease. MAIN PULMONARY ARTERY: Normal in caliber. TRACHEA/BRONCHI: Patent. ESOPHAGUS: No wall thickening or dilatation. HEART: Mildly enlarged. Coronary atherosclerotic calcifications are present. PLEURA: There are small bilateral pleural effusions. LUNGS: There is diffuse groundglass opacity through the lungs with septal  thickening likely related to a combination of atelectasis and edema. Bands of  atelectasis are seen in both lower lobes associated with a small pleural  effusions. No evidence of a mass. LIVER: No mass. No hepatic steatosis. GALLBLADDER: The patient is status post cholecystectomy. The common bile duct  measures 10 mm, which may be a combination of the patient's age and postsurgical  change. SPLEEN: No mass. PANCREAS: No mass or ductal dilatation. ADRENALS: Unremarkable. KIDNEYS: No mass, calculus, or hydronephrosis. There is renal cortical thinning  likely related to chronic medical renal disease. STOMACH: Moderate hiatal hernia. SMALL BOWEL: No dilatation or wall thickening. COLON: No dilatation or wall thickening. APPENDIX: Unremarkable. PERITONEUM: No ascites or pneumoperitoneum. RETROPERITONEUM: No lymphadenopathy or aortic aneurysm. REPRODUCTIVE ORGANS: Status post hysterectomy. URINARY BLADDER: No mass or calculus. The bladder is distended. BONES: There is chronic healed posterior mild deformity of the proximal left  humerus. There are nondisplaced anterior left fourth and fifth rib fractures. ADDITIONAL COMMENTS: N/A     IMPRESSION  IMPRESSION:     1. Acute nondisplaced anterior left fourth and fifth rib fractures. 2. Chronic healed posttraumatic deformity of the proximal left humerus. 3. Mild cardiomegaly with mild edema and small bilateral pleural effusions. 4. Status post cholecystectomy. 5. Renal cortical thinning likely related to chronic medical renal disease. 6. Moderate hiatal hernia. 7. Status post hysterectomy. Medical Decision Making   I am the first provider for this patient. I reviewed the vital signs, available nursing notes, past medical history, past surgical history, family history and social history. Vital Signs-Reviewed the patient's vital signs.   Patient Vitals for the past 12 hrs:   Temp Pulse Resp BP SpO2   12/12/17 1130 - 89 16 151/56 95 %   12/12/17 1119 98.4 °F (36.9 °C) 91 14 169/53 (!) 88 %       Pulse Oximetry Analysis - 89% on RA    Cardiac Monitor:   Rate: 89 bpm  Rhythm: Normal Sinus Rhythm      EKG interpretation: (Preliminary)  Rhythm: normal sinus rhythm; and regular . Rate (approx.): 89; Axis: left axis deviation; TN interval: normal; QRS interval: normal ; ST/T wave: T wave flattening in I, no significant change compared to 7/2017. Records Reviewed: Old Medical Records, Previous electrocardiograms, Previous Radiology Studies and Previous Laboratory Studies    Provider Notes (Medical Decision Making):   Humerus fracture, rib fractures, fall, knee strain, knee sprain, knee fracture    Patient presenting [facility] noted [facility] and then patient c/o pain next day, mobile imaging came out and noted left humerus fracture, left 6-8 rib fractures, left hip film WNL, results were read at [facility] on 12/11/17 so patient then sent to [id number] [facility] ED 12/12/17 for further evaluation, on arrival patient is demented but reported at baseline, not oriented to self, place but moaning in pain with palpation of left shoulder and abduction of left shoulder, pain with palpation of right knee with overlying ecchymoses and proximal tibia, abdomen distended and tense with scattered ecchymoses, superficial abarsions to lower back, ecchymoses of right periorbital area. Due to her mental status unable to accurately determine pain or symptoms and given distended tense abdomen and known rib fractures with concern for effusion will obtain dry scan of chest and abdomen/pelvis to eval for injury given age as well, last creatinine 1.89 so unable to give contrast, will also obtain HCT and spine and place c-collar and obtain plain films of right knee, right tib/fib, left humerus and CXR, labs and likely transfer to [facility] for trauma admission. .  She has been hypoxic to 88-90% on RA and this is not her baseline, she does not appear to be SOB, no increased WOB    ED Course:   Initial assessment performed.  The patients presenting problems have been discussed, and they are in agreement with the care plan formulated and outlined with them. I have encouraged them to ask questions as they arise throughout their visit. 12:43 PM  Attempting to get CT scan and patient agitated hollering out in pain and confused, unable to lay still, have given morphine 2mg already, will give 1mg Haldol, she has no allergies and her QTc was 450    2:41 PM  CXR without fracture, old humerus fracture on humerus xray, still have not obtained CT scans due to agitation, we removed c-collar after discussion with family at bedside given she has been without collar for 2 days since fall and patient seemed to be more agitated with it and they were in agreement, we will give 2.5mg Haldol IV X1 (she has received 2mg morphine and 1mg haldol thus far) she is still agitated with movement, we are straight cathing for urine as well.    3:29 PM  Dr Bernice Astorga called and would like patient discharged back to [facility] if possible. Patient had just completed CT scans, awaiting results for discharge. 3:46 PM  Urine positive will treat given worsening agitation, her last urine culture 7/2017 was morganella and resistant to ceftazidime and cefotaxime but sensitive to Ceftriaxone and Cefepime, was given keflex and switched to cefdinir at that time, will discuss with pharmacy an outpt recommendation for PO antibiotic, no other C/S data in past.    4:30 PM  Patient is calm, she remains 90-91% on RA, HD stable, I spoke with Dr Bernice Astorga and updated him on all results, I explained she has been hypoxic to 88-90% throughout but has remained there throughout her stay, was not tolerating nasal cannula, also we have not ambulated her and assessed her mobility due to her agitation and now sleepiness from haldol, she is awake now, however he would like her to have 1mg IV Bumex and is comfortable with her being discharged despite not being adequately assess for ambulation at this time.         Disposition:  Back to [facility]    I personally performed a history and physical examination of the patient and discussed the management with the resident. I have found the following on physical exam:    Awake, moaning  Airway patent  Neck without step-offs or reproducible pain  L shoulder deformity  abd soft NT  CTA b/l  Ecchymoses of face under eyes    I have reviewed the resident's note and agree with the resident's findings, including all diagnostic interpretations, treatment and plan of care, except as documented below. I was present during the key portions of separately billed procedures. Konstantin Vera MD        Diagnosis     Clinical Impression:   1. Fall, initial encounter    2. Closed nondisplaced fracture of surgical neck of left humerus with delayed healing, unspecified fracture morphology, subsequent encounter    3. Acute cystitis without hematuria    4.  Closed fracture of multiple ribs of left side, initial encounter

## 2017-12-12 NOTE — ED NOTES
Received pt to exam room via EMS for reports of unwitnessed fall on Sunday at [facility], [place] where pt lives. Pt is nonverbal w/ hx of dementia. Per EMS pt was found back in bed on her own apparently on Sunday w/ black eye. Staff reported to EMS pt didn't c/o pain until Monday when mobile imaging was ordered. Results revealed broken L humerus and 3 ribs fx's on L side. Upon assessment in ED, pt was also found to have a displaced AICD, distended abdomen and is also reactive to touch on the LLL. SpO2 was 88%.  Pt placed on 2L NC.

## 2017-12-12 NOTE — ED NOTES
Discharge instructions reviewed w/ pt and copy given by Dr. Tito Monroy. Pt discharged via wheelchair to care of niece.

## 2017-12-12 NOTE — DISCHARGE INSTRUCTIONS
Broken Rib: Care Instructions  Your Care Instructions    A broken rib is a crack or break in one of the bones of the rib cage. Breathing can be very painful because the muscles used for breathing pull on the rib. In most cases, a broken rib will heal on its own. You can take pain medicine while the rib mends. Pain relief allows you to take deep breaths. In the past, doctors recommended taping or wrapping broken ribs. This is no longer done because taping makes it hard for you to take deep breaths. Taking deep breaths may help prevent pneumonia or a partial collapse of a lung. Your rib will heal in about 6 weeks. You heal best when you take good care of yourself. Eat a variety of healthy foods, and don't smoke. Follow-up care is a key part of your treatment and safety. Be sure to make and go to all appointments, and call your doctor if you are having problems. It's also a good idea to know your test results and keep a list of the medicines you take. How can you care for yourself at home? · Be safe with medicines. Read and follow all instructions on the label. ¨ If the doctor gave you a prescription medicine for pain, take it as prescribed. ¨ If you are not taking a prescription pain medicine, ask your doctor if you can take an over-the-counter medicine. · Even if it hurts, try to cough or take the deepest breath you can at least once every hour. This will get air deeply into your lungs. This may reduce your chance of getting pneumonia or a partial collapse of a lung. Hold a pillow against your chest to make this less painful. · Put ice or a cold pack on the area for 10 to 20 minutes at a time. Put a thin cloth between the ice and your skin. When should you call for help? Call 911 anytime you think you may need emergency care. For example, call if:  ? · You have severe trouble breathing. ?Call your doctor now or seek immediate medical care if:  ? · You have some trouble breathing.    ? · You have a fever.   ? · You have a new or worse cough. ? Watch closely for changes in your health, and be sure to contact your doctor if:  ? · You have pain even after taking your medicine. ? · You do not get better as expected. Where can you learn more? Go to http://breana-evan.info/. Enter M135 in the search box to learn more about \"Broken Rib: Care Instructions. \"  Current as of: March 21, 2017  Content Version: 11.4  © 0088-1147 [employer]. Care instructions adapted under license by [employer] (which disclaims liability or warranty for this information). If you have questions about a medical condition or this instruction, always ask your healthcare professional. [address] any warranty or liability for your use of this information. Please take the medication as prescribed. Preventing Falls: Care Instructions  Your Care Instructions    Getting around your home safely can be a challenge if you have injuries or health problems that make it easy for you to fall. Loose rugs and furniture in walkways are among the dangers for many older people who have problems walking or who have poor eyesight. People who have conditions such as arthritis, osteoporosis, or dementia also have to be careful not to fall. You can make your home safer with a few simple measures. Follow-up care is a key part of your treatment and safety. Be sure to make and go to all appointments, and call your doctor if you are having problems. It's also a good idea to know your test results and keep a list of the medicines you take. How can you care for yourself at home? Taking care of yourself  · You may get dizzy if you do not drink enough water. To prevent dehydration, drink plenty of fluids, enough so that your urine is light yellow or clear like water. Choose water and other caffeine-free clear liquids.  If you have kidney, heart, or liver disease and have to limit fluids, talk with your doctor before you increase the amount of fluids you drink. · Exercise regularly to improve your strength, muscle tone, and balance. Walk if you can. Swimming may be a good choice if you cannot walk easily. · Have your vision and hearing checked each year or any time you notice a change. If you have trouble seeing and hearing, you might not be able to avoid objects and could lose your balance. · Know the side effects of the medicines you take. Ask your doctor or pharmacist whether the medicines you take can affect your balance. Sleeping pills or sedatives can affect your balance. · Limit the amount of alcohol you drink. Alcohol can impair your balance and other senses. · Ask your doctor whether calluses or corns on your feet need to be removed. If you wear loose-fitting shoes because of calluses or corns, you can lose your balance and fall. · Talk to your doctor if you have numbness in your feet. Preventing falls at home  · Remove raised doorway thresholds, throw rugs, and clutter. Repair loose carpet or raised areas in the floor. · Move furniture and electrical cords to keep them out of walking paths. · Use nonskid floor wax, and wipe up spills right away, especially on ceramic tile floors. · If you use a walker or cane, put rubber tips on it. If you use crutches, clean the bottoms of them regularly with an abrasive pad, such as steel wool. · Keep your house well lit, especially [facility], and outside walkways. Use night-lights in areas such as hallways and bathrooms. Add extra light switches or use remote switches (such as switches that go on or off when you clap your hands) to make it easier to turn lights on if you have to get up during the night. · Install sturdy handrails on stairways. · Move items in your cabinets so that the things you use a lot are on the lower shelves (about waist level). · Keep a cordless phone and a flashlight with new batteries by your bed.  If possible, put a phone in each of the main rooms of your house, or carry a cell phone in case you fall and cannot reach a phone. Or, you can wear a device around your neck or wrist. You push a button that sends a signal for help. · Wear low-heeled shoes that fit well and give your feet good support. Use footwear with nonskid soles. Check the heels and soles of your shoes for wear. Repair or replace worn heels or soles. · Do not wear socks without shoes on wood floors. · Walk on the grass when the sidewalks are slippery. If you live in an area that gets snow and ice in the winter, sprinkle salt on slippery steps and sidewalks. Preventing falls in the bath  · Install grab bars and nonskid mats inside and outside your shower or tub and near the toilet and sinks. · Use shower chairs and bath benches. · Use a hand-held shower head that will allow you to sit while showering. · Get into a tub or shower by putting the weaker leg in first. Get out of a tub or shower with your strong side first.  · Repair loose toilet seats and consider installing a raised toilet seat to make getting on and off the toilet easier. · Keep your bathroom door unlocked while you are in the shower. Where can you learn more? Go to http://breana-evan.info/. Enter [id number] [id number] in the search box to learn more about \"Preventing Falls: Care Instructions. \"  Current as of: May 12, 2017  Content Version: 11.4  © 0217-6158 [employer]. Care instructions adapted under license by [employer] (which disclaims liability or warranty for this information). If you have questions about a medical condition or this instruction, always ask your healthcare professional. [address] any warranty or liability for your use of this information. Broken Arm: Care Instructions  Your Care Instructions  Fractures can range from a small, hairline crack, to a bone or bones broken into two or more pieces.  Your treatment depends on how bad the break is. Your doctor may have put your arm in a splint or cast to allow it to heal or to keep it stable until you see another doctor. It may take weeks or months for your arm to heal. You can help your arm heal with some care at home. You heal best when you take good care of yourself. Eat a variety of healthy foods, and don't smoke. You may have had a sedative to help you relax. You may be unsteady after having sedation. It can take a few hours for the medicine's effects to wear off. Common side effects of sedation include nausea, vomiting, and feeling sleepy or tired. The doctor has checked you carefully, but problems can develop later. If you notice any problems or new symptoms, get medical treatment right away. Follow-up care is a key part of your treatment and safety. Be sure to make and go to all appointments, and call your doctor if you are having problems. It's also a good idea to know your test results and keep a list of the medicines you take. How can you care for yourself at home? · If the doctor gave you a sedative:  ¨ For 24 hours, don't do anything that requires attention to detail. It takes time for the medicine's effects to completely wear off. ¨ For your safety, do not drive or operate any machinery that could be dangerous. Wait until the medicine wears off and you can think clearly and react easily. · Put ice or a cold pack on your arm for 10 to 20 minutes at a time. Try to do this every 1 to 2 hours for the next 3 days (when you are awake). Put a thin cloth between the ice and your cast or splint. Keep the cast or splint dry. · Follow the cast care instructions your doctor gives you. If you have a splint, do not take it off unless your doctor tells you to. · Be safe with medicines. Take pain medicines exactly as directed. ¨ If the doctor gave you a prescription medicine for pain, take it as prescribed.   ¨ If you are not taking a prescription pain medicine, ask your doctor if you can take an over-the-counter medicine. · Prop up your arm on pillows when you sit or lie down in the first few days after the injury. Keep the arm higher than the level of your heart. This will help reduce swelling. · Follow instructions for exercises to keep your arm strong. · Wiggle your fingers and wrist often to reduce swelling and stiffness. When should you call for help? Call 911 anytime you think you may need emergency care. For example, call if:  ? · You are very sleepy and you have trouble waking up. ?Call your doctor now or seek immediate medical care if:  ? · You have new or worse nausea or vomiting. ? · You have new or worse pain. ? · Your hand or fingers are cool or pale or change color. ? · Your cast or splint feels too tight. ? · You have tingling, weakness, or numbness in your hand or fingers. ? Watch closely for changes in your health, and be sure to contact your doctor if:  ? · You do not get better as expected. ? · You have problems with your cast or splint. Where can you learn more? Go to http://breana-evan.info/. Enter W220 in the search box to learn more about \"Broken Arm: Care Instructions. \"  Current as of: March 21, 2017  Content Version: 11.4  © 3993-7956 [employer]. Care instructions adapted under license by [employer] (which disclaims liability or warranty for this information). If you have questions about a medical condition or this instruction, always ask your healthcare professional. [place] [address] any warranty or liability for your use of this information. Urinary Tract Infection in Women: Care Instructions  Your Care Instructions    A urinary tract infection, or UTI, is a general term for an infection anywhere between the kidneys and the urethra (where urine comes out). Most UTIs are bladder infections. They often cause pain or burning when you urinate.   UTIs are caused by bacteria and can be cured with antibiotics. Be sure to complete your treatment so that the infection goes away. Follow-up care is a key part of your treatment and safety. Be sure to make and go to all appointments, and call your doctor if you are having problems. It's also a good idea to know your test results and keep a list of the medicines you take. How can you care for yourself at home? · Take your antibiotics as directed. Do not stop taking them just because you feel better. You need to take the full course of antibiotics. · Drink extra water and other fluids for the next day or two. This may help wash out the bacteria that are causing the infection. (If you have kidney, heart, or liver disease and have to limit fluids, talk with your doctor before you increase your fluid intake.)  · Avoid drinks that are carbonated or have caffeine. They can irritate the bladder. · Urinate often. Try to empty your bladder each time. · To relieve pain, take a hot bath or lay a heating pad set on low over your lower belly or genital area. Never go to sleep with a heating pad in place. To prevent UTIs  · Drink plenty of water each day. This helps you urinate often, which clears bacteria from your system. (If you have kidney, heart, or liver disease and have to limit fluids, talk with your doctor before you increase your fluid intake.)  · Urinate when you need to. · Urinate right after you have sex. · Change sanitary pads often. · Avoid douches, bubble baths, feminine hygiene sprays, and other feminine hygiene products that have deodorants. · After going to the bathroom, wipe from front to back. When should you call for help? Call your doctor now or seek immediate medical care if:  ? · Symptoms such as fever, chills, nausea, or vomiting get worse or appear for the first time. ? · You have new pain in your back just below your rib cage. This is called flank pain.    ? · There is new blood or pus in your urine.   ? · You have any problems with your antibiotic medicine. ? Watch closely for changes in your health, and be sure to contact your doctor if:  ? · You are not getting better after taking an antibiotic for 2 days. ? · Your symptoms go away but then come back. Where can you learn more? Go to http://breana-evan.info/. Enter P843 in the search box to learn more about \"Urinary Tract Infection in Women: Care Instructions. \"  Current as of: May 12, 2017  Content Version: 11.4  © 5509-2787 [employer]. Care instructions adapted under license by [email or web address] (which disclaims liability or warranty for this information). If you have questions about a medical condition or this instruction, always ask your healthcare professional. [address] any warranty or liability for your use of this information.

## 2017-12-12 NOTE — PROGRESS NOTES
Pt is 81 y/o female came to ER from [facility] memory care unit after fall. As per pt's MD pt is discharging back to [facility] and pt's PCP is in agreement with discharge plan. Jasmeet spoke with Colin Del Cid at memory care unit and requested for transportation. [facility] said they will send a  to  pt. As per Colin Del Cid pt uses her walker and she is ambulatory. Jasmeet requested  MD for chest xray and notified pt's nurse to send a copy of chest xray to [facility] with pt.     Zoraida Singh MSW  ED Case Manager   Ext [phone]

## 2017-12-29 ENCOUNTER — HOSPITAL ENCOUNTER (INPATIENT)
Age: 82
LOS: 7 days | Discharge: NURSING, REHAB OR LONG-TERM CARE | DRG: 177 | End: 2018-01-05
Attending: EMERGENCY MEDICINE | Admitting: INTERNAL MEDICINE
Payer: MEDICARE

## 2017-12-29 ENCOUNTER — APPOINTMENT (OUTPATIENT)
Dept: GENERAL RADIOLOGY | Age: 82
DRG: 177 | End: 2017-12-29
Attending: EMERGENCY MEDICINE
Payer: MEDICARE

## 2017-12-29 DIAGNOSIS — J69.0 ASPIRATION PNEUMONIA OF RIGHT LOWER LOBE DUE TO VOMIT (HCC): ICD-10-CM

## 2017-12-29 DIAGNOSIS — J69.0 ASPIRATION PNEUMONIA OF RIGHT LOWER LOBE DUE TO GASTRIC SECRETIONS (HCC): ICD-10-CM

## 2017-12-29 DIAGNOSIS — J96.01 ACUTE RESPIRATORY FAILURE WITH HYPOXIA (HCC): Primary | ICD-10-CM

## 2017-12-29 DIAGNOSIS — J96.00 ACUTE RESPIRATORY FAILURE, UNSPECIFIED WHETHER WITH HYPOXIA OR HYPERCAPNIA (HCC): ICD-10-CM

## 2017-12-29 DIAGNOSIS — N18.30 CKD (CHRONIC KIDNEY DISEASE) STAGE 3, GFR 30-59 ML/MIN (HCC): ICD-10-CM

## 2017-12-29 DIAGNOSIS — E87.20 LACTIC ACIDOSIS: ICD-10-CM

## 2017-12-29 LAB
ALBUMIN SERPL-MCNC: 3.1 G/DL (ref 3.5–5)
ALBUMIN/GLOB SERPL: 0.6 {RATIO} (ref 1.1–2.2)
ALP SERPL-CCNC: 166 U/L (ref 45–117)
ALT SERPL-CCNC: 17 U/L (ref 12–78)
ANION GAP SERPL CALC-SCNC: 10 MMOL/L (ref 5–15)
ANION GAP SERPL CALC-SCNC: 10 MMOL/L (ref 5–15)
AST SERPL-CCNC: 22 U/L (ref 15–37)
BASOPHILS # BLD: 0 K/UL
BASOPHILS NFR BLD: 0 %
BILIRUB SERPL-MCNC: 1 MG/DL (ref 0.2–1)
BNP SERPL-MCNC: 1939 PG/ML (ref 0–450)
BUN SERPL-MCNC: 41 MG/DL (ref 6–20)
BUN SERPL-MCNC: 42 MG/DL (ref 6–20)
BUN/CREAT SERPL: 18 (ref 12–20)
BUN/CREAT SERPL: 18 (ref 12–20)
CALCIUM SERPL-MCNC: 8.1 MG/DL (ref 8.5–10.1)
CALCIUM SERPL-MCNC: 8.6 MG/DL (ref 8.5–10.1)
CHLORIDE SERPL-SCNC: 101 MMOL/L (ref 97–108)
CHLORIDE SERPL-SCNC: 103 MMOL/L (ref 97–108)
CO2 SERPL-SCNC: 27 MMOL/L (ref 21–32)
CO2 SERPL-SCNC: 27 MMOL/L (ref 21–32)
CREAT SERPL-MCNC: 2.29 MG/DL (ref 0.55–1.02)
CREAT SERPL-MCNC: 2.32 MG/DL (ref 0.55–1.02)
EOSINOPHIL # BLD: 0 K/UL
EOSINOPHIL NFR BLD: 0 %
ERYTHROCYTE [DISTWIDTH] IN BLOOD BY AUTOMATED COUNT: 15.9 % (ref 11.5–14.5)
GLOBULIN SER CALC-MCNC: 4.8 G/DL (ref 2–4)
GLUCOSE BLD STRIP.AUTO-MCNC: 253 MG/DL (ref 65–100)
GLUCOSE SERPL-MCNC: 233 MG/DL (ref 65–100)
GLUCOSE SERPL-MCNC: 243 MG/DL (ref 65–100)
HCT VFR BLD AUTO: 37.5 % (ref 35–47)
HGB BLD-MCNC: 12.2 G/DL (ref 11.5–16)
LACTATE SERPL-SCNC: 3.2 MMOL/L (ref 0.4–2)
LACTATE SERPL-SCNC: 5 MMOL/L (ref 0.4–2)
LYMPHOCYTES # BLD: 0.4 K/UL
LYMPHOCYTES NFR BLD: 5 %
MAGNESIUM SERPL-MCNC: 2.4 MG/DL (ref 1.6–2.4)
MCH RBC QN AUTO: 30.9 PG (ref 26–34)
MCHC RBC AUTO-ENTMCNC: 32.5 G/DL (ref 30–36.5)
MCV RBC AUTO: 94.9 FL (ref 80–99)
METAMYELOCYTES NFR BLD MANUAL: 1 %
MONOCYTES # BLD: 0.2 K/UL
MONOCYTES NFR BLD: 3 %
NEUTS BAND NFR BLD MANUAL: 21 %
NEUTS SEG # BLD: 7.1 K/UL
NEUTS SEG NFR BLD: 70 %
PLATELET # BLD AUTO: 285 K/UL (ref 150–400)
POTASSIUM SERPL-SCNC: 4.1 MMOL/L (ref 3.5–5.1)
POTASSIUM SERPL-SCNC: 4.3 MMOL/L (ref 3.5–5.1)
PROT SERPL-MCNC: 7.9 G/DL (ref 6.4–8.2)
RBC # BLD AUTO: 3.95 M/UL (ref 3.8–5.2)
RBC MORPH BLD: ABNORMAL
SERVICE CMNT-IMP: ABNORMAL
SODIUM SERPL-SCNC: 138 MMOL/L (ref 136–145)
SODIUM SERPL-SCNC: 140 MMOL/L (ref 136–145)
TROPONIN I SERPL-MCNC: <0.04 NG/ML
WBC # BLD AUTO: 7.8 K/UL (ref 3.6–11)

## 2017-12-29 PROCEDURE — 77030029684 HC NEB SM VOL KT MONA -A

## 2017-12-29 PROCEDURE — 83605 ASSAY OF LACTIC ACID: CPT | Performed by: INTERNAL MEDICINE

## 2017-12-29 PROCEDURE — 36415 COLL VENOUS BLD VENIPUNCTURE: CPT | Performed by: EMERGENCY MEDICINE

## 2017-12-29 PROCEDURE — 99285 EMERGENCY DEPT VISIT HI MDM: CPT

## 2017-12-29 PROCEDURE — 80053 COMPREHEN METABOLIC PANEL: CPT | Performed by: EMERGENCY MEDICINE

## 2017-12-29 PROCEDURE — 74011250636 HC RX REV CODE- 250/636: Performed by: INTERNAL MEDICINE

## 2017-12-29 PROCEDURE — 87040 BLOOD CULTURE FOR BACTERIA: CPT | Performed by: EMERGENCY MEDICINE

## 2017-12-29 PROCEDURE — 94761 N-INVAS EAR/PLS OXIMETRY MLT: CPT

## 2017-12-29 PROCEDURE — 83605 ASSAY OF LACTIC ACID: CPT | Performed by: EMERGENCY MEDICINE

## 2017-12-29 PROCEDURE — 74011250636 HC RX REV CODE- 250/636: Performed by: EMERGENCY MEDICINE

## 2017-12-29 PROCEDURE — 82962 GLUCOSE BLOOD TEST: CPT

## 2017-12-29 PROCEDURE — 71010 XR CHEST SNGL V: CPT

## 2017-12-29 PROCEDURE — 93005 ELECTROCARDIOGRAM TRACING: CPT

## 2017-12-29 PROCEDURE — 83880 ASSAY OF NATRIURETIC PEPTIDE: CPT | Performed by: EMERGENCY MEDICINE

## 2017-12-29 PROCEDURE — 74011000250 HC RX REV CODE- 250: Performed by: INTERNAL MEDICINE

## 2017-12-29 PROCEDURE — 77030038269 HC DRN EXT URIN PURWCK BARD -A

## 2017-12-29 PROCEDURE — 85025 COMPLETE CBC W/AUTO DIFF WBC: CPT | Performed by: EMERGENCY MEDICINE

## 2017-12-29 PROCEDURE — 83735 ASSAY OF MAGNESIUM: CPT | Performed by: EMERGENCY MEDICINE

## 2017-12-29 PROCEDURE — 65660000000 HC RM CCU STEPDOWN

## 2017-12-29 PROCEDURE — 80048 BASIC METABOLIC PNL TOTAL CA: CPT | Performed by: INTERNAL MEDICINE

## 2017-12-29 PROCEDURE — 84484 ASSAY OF TROPONIN QUANT: CPT | Performed by: EMERGENCY MEDICINE

## 2017-12-29 PROCEDURE — 94640 AIRWAY INHALATION TREATMENT: CPT

## 2017-12-29 RX ORDER — FUROSEMIDE 10 MG/ML
40 INJECTION INTRAMUSCULAR; INTRAVENOUS DAILY
Status: COMPLETED | OUTPATIENT
Start: 2017-12-30 | End: 2017-12-31

## 2017-12-29 RX ORDER — LEVOTHYROXINE SODIUM 88 UG/1
88 TABLET ORAL
Status: DISCONTINUED | OUTPATIENT
Start: 2017-12-30 | End: 2018-01-05 | Stop reason: HOSPADM

## 2017-12-29 RX ORDER — FUROSEMIDE 80 MG/1
160 TABLET ORAL 2 TIMES DAILY
Status: ON HOLD | COMMUNITY
End: 2018-01-05

## 2017-12-29 RX ORDER — METOPROLOL TARTRATE 75 MG/1
75 TABLET, FILM COATED ORAL 2 TIMES DAILY
COMMUNITY

## 2017-12-29 RX ORDER — SODIUM CHLORIDE 9 MG/ML
50 INJECTION, SOLUTION INTRAVENOUS CONTINUOUS
Status: DISCONTINUED | OUTPATIENT
Start: 2017-12-29 | End: 2017-12-31

## 2017-12-29 RX ORDER — METOPROLOL TARTRATE 5 MG/5ML
5 INJECTION INTRAVENOUS EVERY 4 HOURS
Status: DISCONTINUED | OUTPATIENT
Start: 2017-12-29 | End: 2018-01-03

## 2017-12-29 RX ORDER — CLOPIDOGREL BISULFATE 75 MG/1
75 TABLET ORAL DAILY
Status: DISCONTINUED | OUTPATIENT
Start: 2017-12-30 | End: 2018-01-02

## 2017-12-29 RX ORDER — HEPARIN SODIUM 5000 [USP'U]/ML
5000 INJECTION, SOLUTION INTRAVENOUS; SUBCUTANEOUS EVERY 8 HOURS
Status: DISCONTINUED | OUTPATIENT
Start: 2017-12-29 | End: 2018-01-03

## 2017-12-29 RX ORDER — ATORVASTATIN CALCIUM 40 MG/1
40 TABLET, FILM COATED ORAL DAILY
Status: DISCONTINUED | OUTPATIENT
Start: 2017-12-30 | End: 2018-01-05 | Stop reason: ALTCHOICE

## 2017-12-29 RX ORDER — IPRATROPIUM BROMIDE AND ALBUTEROL SULFATE 2.5; .5 MG/3ML; MG/3ML
3 SOLUTION RESPIRATORY (INHALATION)
Status: DISCONTINUED | OUTPATIENT
Start: 2017-12-29 | End: 2017-12-30

## 2017-12-29 RX ORDER — CALCIUM CARBONATE 500(1250)
1 TABLET ORAL 2 TIMES DAILY
COMMUNITY
End: 2018-01-05

## 2017-12-29 RX ORDER — SODIUM CHLORIDE 0.9 % (FLUSH) 0.9 %
5-10 SYRINGE (ML) INJECTION EVERY 8 HOURS
Status: DISCONTINUED | OUTPATIENT
Start: 2017-12-29 | End: 2018-01-05 | Stop reason: ALTCHOICE

## 2017-12-29 RX ORDER — ACETAMINOPHEN 325 MG/1
650 TABLET ORAL
Status: DISCONTINUED | OUTPATIENT
Start: 2017-12-29 | End: 2018-01-05 | Stop reason: ALTCHOICE

## 2017-12-29 RX ORDER — FAMOTIDINE 20 MG/1
20 TABLET, FILM COATED ORAL DAILY
Status: DISCONTINUED | OUTPATIENT
Start: 2017-12-30 | End: 2018-01-05 | Stop reason: HOSPADM

## 2017-12-29 RX ORDER — ACETAMINOPHEN 500 MG
1000 TABLET ORAL
COMMUNITY

## 2017-12-29 RX ORDER — SODIUM CHLORIDE 0.9 % (FLUSH) 0.9 %
5-10 SYRINGE (ML) INJECTION AS NEEDED
Status: DISCONTINUED | OUTPATIENT
Start: 2017-12-29 | End: 2017-12-30 | Stop reason: SDUPTHER

## 2017-12-29 RX ORDER — SODIUM CHLORIDE 0.9 % (FLUSH) 0.9 %
5-10 SYRINGE (ML) INJECTION EVERY 8 HOURS
Status: DISCONTINUED | OUTPATIENT
Start: 2017-12-29 | End: 2017-12-30 | Stop reason: SDUPTHER

## 2017-12-29 RX ORDER — INSULIN LISPRO 100 [IU]/ML
4 INJECTION, SOLUTION INTRAVENOUS; SUBCUTANEOUS 3 TIMES DAILY
COMMUNITY
End: 2018-01-17 | Stop reason: DRUGHIGH

## 2017-12-29 RX ORDER — AMLODIPINE BESYLATE 5 MG/1
5 TABLET ORAL DAILY
Status: DISCONTINUED | OUTPATIENT
Start: 2017-12-30 | End: 2018-01-05 | Stop reason: HOSPADM

## 2017-12-29 RX ORDER — ADHESIVE BANDAGE
30 BANDAGE TOPICAL DAILY PRN
COMMUNITY
End: 2018-01-05

## 2017-12-29 RX ORDER — ALLOPURINOL 100 MG/1
100 TABLET ORAL DAILY
Status: DISCONTINUED | OUTPATIENT
Start: 2017-12-30 | End: 2018-01-05 | Stop reason: HOSPADM

## 2017-12-29 RX ORDER — ENOXAPARIN SODIUM 100 MG/ML
40 INJECTION SUBCUTANEOUS EVERY 24 HOURS
Status: DISCONTINUED | OUTPATIENT
Start: 2017-12-29 | End: 2017-12-29

## 2017-12-29 RX ORDER — SODIUM CHLORIDE 0.9 % (FLUSH) 0.9 %
5-10 SYRINGE (ML) INJECTION AS NEEDED
Status: DISCONTINUED | OUTPATIENT
Start: 2017-12-29 | End: 2018-01-05 | Stop reason: ALTCHOICE

## 2017-12-29 RX ORDER — HEPARIN SODIUM 5000 [USP'U]/ML
5000 INJECTION, SOLUTION INTRAVENOUS; SUBCUTANEOUS EVERY 8 HOURS
Status: DISCONTINUED | OUTPATIENT
Start: 2017-12-29 | End: 2017-12-29

## 2017-12-29 RX ADMIN — Medication 10 ML: at 22:12

## 2017-12-29 RX ADMIN — IPRATROPIUM BROMIDE AND ALBUTEROL SULFATE 3 ML: .5; 3 SOLUTION RESPIRATORY (INHALATION) at 22:22

## 2017-12-29 RX ADMIN — HEPARIN SODIUM 5000 UNITS: 5000 INJECTION, SOLUTION INTRAVENOUS; SUBCUTANEOUS at 20:53

## 2017-12-29 RX ADMIN — SODIUM CHLORIDE 50 ML/HR: 900 INJECTION, SOLUTION INTRAVENOUS at 22:15

## 2017-12-29 RX ADMIN — PIPERACILLIN SODIUM AND TAZOBACTAM SODIUM 3.38 G: .375; 3 INJECTION, POWDER, LYOPHILIZED, FOR SOLUTION INTRAVENOUS at 20:54

## 2017-12-29 NOTE — ED TRIAGE NOTES
Per niece, Pt. Has hx. Of dementia. Pt. Typically knows who she is. States pt. Vomited today. Pt. Is normally ambulatory and has a Rolator.

## 2017-12-29 NOTE — IP AVS SNAPSHOT
[address] [facility] 83. 
[phone] Patient: Suni Mazariegos MRN: [id number] :10/31/1924 About your hospitalization You were admitted on:  2017 You last received care in the:  [facility] 2 CARDIOPULMONARY CARE You were discharged on:  2018 Why you were hospitalized Your primary diagnosis was:  Acute Respiratory Failure (Hcc) Your diagnoses also included:  Aspiration Pneumonia (Hcc), Lactic Acidosis, Dm (Diabetes Mellitus), Type 2, Uncontrolled, With Renal Complications (Hcc), Ckd (Chronic Kidney Disease) Stage 3, Gfr 30-59 Ml/Min, Dementia Follow-up Information Follow up With Details Comments Contact Info Alberto Brambila MD In 3 days  [address] [facility] 83. 
[phone] [facility]  This is your post-acute LTAC provider. [facility],6[address] Floor [address] [facility] 
[phone] Discharge Orders None A check meera indicates which time of day the medication should be taken. My Medications START taking these medications Instructions Each Dose to Equal  
 Morning Noon Evening Bedtime  
 cephALEXin 500 mg capsule Commonly known as:  Dariusz Dials Your last dose was: Your next dose is: Take 1 Cap by mouth three (3) times daily. 500 mg  
    
   
   
   
  
 potassium chloride SR 10 mEq tablet Commonly known as:  KLOR-CON 10 Your last dose was: Your next dose is: Take 1 Tab by mouth three (3) times daily. 10 mEq CHANGE how you take these medications Instructions Each Dose to Equal  
 Morning Noon Evening Bedtime  
 furosemide 80 mg tablet Commonly known as:  LASIX What changed:   
- when to take this - Another medication with the same name was removed.  Continue taking this medication, and follow the directions you see here. Your last dose was: Your next dose is: Take 2 Tabs by mouth daily. 160 mg CONTINUE taking these medications Instructions Each Dose to Equal  
 Morning Noon Evening Bedtime  
 acetaminophen 500 mg tablet Commonly known as:  TYLENOL Your last dose was: Your next dose is: Take 1,000 mg by mouth every four (4) hours as needed for Pain. 1000 mg  
    
   
   
   
  
 allopurinol 100 mg tablet Commonly known as:  Glynn Croon Your last dose was: Your next dose is: Take 1 Tab by mouth daily. 100 mg  
    
   
   
   
  
 amLODIPine 5 mg tablet Commonly known as:  Sherin Romero Your last dose was: Your next dose is: Take 5 mg by mouth daily. 5 mg  
    
   
   
   
  
 glucagon 1 mg injection Commonly known as:  Karthik Falling Your last dose was: Your next dose is:    
   
   
 1 mg by IntraVENous route as needed ('Hypoglycemia'). 'Give if blood sugar <60 and unconscious and call 911. If no response in 15 minutes repeat.'  
 1 mg HumaLOG 100 unit/mL injection Generic drug:  insulin lispro Your last dose was: Your next dose is:    
   
   
 4 Units by SubCUTAneous route three (3) times daily. 'Hold if Blood sugar is below 120 - Notify Physician of Blood Sugar <60 and >350.' 'If above 350 give a total of 7 units. '  
 4 Units HumuLIN N 100 unit/mL injection Generic drug:  insulin NPH Your last dose was: Your next dose is:    
   
   
 16 Units by SubCUTAneous route two (2) times a day. 'Hold for Blood Sugars below 120. Notify physician of Blood sugar <60 and >350.'  
 16 Units LEVOXYL 88 mcg tablet Generic drug:  levothyroxine Your last dose was: Your next dose is: Take 88 mcg by mouth daily (before breakfast). 88 mcg  
    
   
   
   
  
 metoprolol tartrate 75 mg Tab Your last dose was: Your next dose is: Take 75 mg by mouth two (2) times a day. 75 mg  
    
   
   
   
  
 ZANTAC 150 mg tablet Generic drug:  raNITIdine Your last dose was: Your next dose is: Take 150 mg by mouth two (2) times a day. 150 mg  
    
   
   
   
  
  
STOP taking these medications LIPITOR 40 mg tablet Generic drug:  atorvastatin OYSTER SHELL CALCIUM 500 mg calcium (1,250 mg) tablet Generic drug:  calcium carbonate KING MILK OF MAGNESIA 400 mg/5 mL suspension Generic drug:  magnesium hydroxide PLAVIX 75 mg Tab Generic drug:  clopidogrel Where to Get Your Medications Information on where to get these meds will be given to you by the nurse or doctor. ! Ask your nurse or doctor about these medications  
  cephALEXin 500 mg capsule  
 furosemide 80 mg tablet  
 potassium chloride SR 10 mEq tablet Discharge Instructions PATIENT DISCHARGE INSTRUCTIONS [employer] Activation Thank you for requesting access to [employer]. Please follow the instructions below to securely access and download your online medical record. [employer] allows you to send messages to your doctor, view your test results, renew your prescriptions, schedule appointments, and more. How Do I Sign Up? 1. In your internet browser, go to [email or web address] 
2. Click on the First Time User? Click Here link in the Sign In box. You will be redirect to the New Member Sign Up page. 3. Enter your [employer] Access Code exactly as it appears below. You will not need to use this code after youve completed the sign-up process. If you do not sign up before the expiration date, you must request a new code.  
 
[employer] Access Code: [email or web address] 
 Expires: 3/12/2018  4:16 PM (This is the date your [employer] access code will ) 4. Enter the last four digits of your Social Security Number (xxxx) and Date of Birth (mm/dd/yyyy) as indicated and click Submit. You will be taken to the next sign-up page. 5. Create a [employer] ID. This will be your [employer] login ID and cannot be changed, so think of one that is secure and easy to remember. 6. Create a [employer] password. You can change your password at any time. 7. Enter your Password Reset Question and Answer. This can be used at a later time if you forget your password. 8. Enter your e-mail address. You will receive e-mail notification when new information is available in [address] 9. Click Sign Up. You can now view and download portions of your medical record. 10. Click the Download Summary menu link to download a portable copy of your medical information. Additional Information If you have questions, please visit the Frequently Asked Questions section of the [employer] website at [email or web address] Remember, [employer] is NOT to be used for urgent needs. For medical emergencies, dial 911. PATIENT DISCHARGE INSTRUCTIONS Joann Bolanos / [id number] : 10/31/1924 Admitted 2017 Discharged: 2018 · It is important that you take the medication exactly as they are prescribed. · Keep your medication in the bottles provided by the pharmacist and keep a list of the medication names, dosages, and times to be taken in your wallet. · Do not take other medications without consulting your doctor. What to do at [facility] Recommended Diet: Diabetic Diet Recommended Activity: Activity as tolerated Signed By: Ina Dozier MD   
 2018 [employer] Announcement We are excited to announce that we are making your provider's discharge notes available to you in [employer].   You will see these notes when they are completed and signed by the physician that discharged you from your recent hospital stay. If you have any questions or concerns about any information you see in [employer], please call the Health Information Department where you were seen or reach out to your Primary Care Provider for more information about your plan of care. Introducing [facility]! [facility] introduces [employer]Contact patient portal. Now you can access parts of your medical record, email your doctor's office, and request medication refills online. 1. In your internet browser, go to [email or web address] 2. Click on the First Time User? Click Here link in the Sign In box. You will see the New Member Sign Up page. 3. Enter your [employer] Access Code exactly as it appears below. You will not need to use this code after youve completed the sign-up process. If you do not sign up before the expiration date, you must request a new code. · [employer] Access Code: [email or web address] Expires: 3/12/2018  4:16 PM 
 
4. Enter the last four digits of your Social Security Number (xxxx) and Date of Birth (mm/dd/yyyy) as indicated and click Submit. You will be taken to the next sign-up page. 5. Create a [employer] ID. This will be your [employer] login ID and cannot be changed, so think of one that is secure and easy to remember. 6. Create a [employer] password. You can change your password at any time. 7. Enter your Password Reset Question and Answer. This can be used at a later time if you forget your password. 8. Enter your e-mail address. You will receive e-mail notification when new information is available in [address] 9. Click Sign Up. You can now view and download portions of your medical record. 10. Click the Download Summary menu link to download a portable copy of your medical information.  
 
If you have questions, please visit the Frequently Asked Questions section of the [facility]. Remember, MyChart is NOT to be used for urgent needs. For medical emergencies, dial 911. Now available from your iPhone and Android! Providers Seen During Your Hospitalization Provider Specialty Primary office phone Lucy Guevara MD Emergency Medicine [phone] Rex Skinner MD Internal Medicine [phone] Your Primary Care Physician (PCP) Primary Care Physician Office Phone Office Fax Liyah Flynn [phone] You are allergic to the following No active allergies Recent Documentation Height Weight BMI OB Status Smoking Status 1.549 m 60.8 kg 25.32 kg/m2 Hysterectomy Never Smoker Emergency Contacts Name Discharge Info Relation Home Work Mobile Naya Briscoe DISCHARGE CAREGIVER [3] Other Relative [6] [phone] Mary Chau DISCHARGE CAREGIVER [3] Other Relative [6] [phone] Carla Silva 157 CAREGIVER [3] Other Relative [6] [phone] Sheri Cardona N/A  AT THIS TIME [6] Other Relative [6] [phone] Wicho Briscoe  Other Relative [6] [phone] Patient Belongings The following personal items are in your possession at time of discharge: 
            Home Medications: None   Jewelry: Watch  Clothing: Jacket/Coat, Pants, Shirt, Socks, Undergarments, With patient    Other Valuables: None Please provide this summary of care documentation to your next provider. Signatures-by signing, you are acknowledging that this After Visit Summary has been reviewed with you and you have received a copy. Patient Signature:  ____________________________________________________________ Date:  ____________________________________________________________  
  
[facility] Signature:  ____________________________________________________________ Date:  ____________________________________________________________

## 2017-12-29 NOTE — ED PROVIDER NOTES
EMERGENCY DEPARTMENT HISTORY AND PHYSICAL EXAM      Date: 12/29/2017  Patient Name: Nelia Martinez    History of Presenting Illness     Chief Complaint   Patient presents with    Vomiting     patient aspirated while vomiting. Received Phenergan 12.5 and Zofran at [address] Diarrhea    Respiratory Distress     sat decreased to 72% RA, now 85% on 4 L       History Provided By: EMS and Patient's Niece    HPI: Nelia Martinez, 80 y.o. female with PMHx significant for hypothyroidism, CKD, gout, syncope, HLD, CAD, DM, HTN, sleep apnea, cardiomyopathy, dementia who presents via EMS from [facility] to the ED with cc of sudden onset of N/V/D that onset today. Niece states that the pt may have aspirated after an episode of vomiting noting that she was told the pt's SpO2 was 30 requiring her to be placed on 4 L nasal canula which improved her SpO2. Niece denies use of home O2 at baseline. Niece states that the pt is normally ambulatory at baseline with a rollator and able to communicate at baseline. Niece reports that the pt has lower extremity edema that is unchanged from baseline. Niece notes that [facility] have been on lock down and not excepting visitors secondary to flu like illness that is going around the facility. Per chart review [facility] [address] for a fall. Per chart review pt was diagnosed with a UTI and a Closed nondisplaced fracture of surgical neck of left humerus. Niece denies any fevers or chills. PCP: Nathaly Saxena MD    HPI is limited secondary to pt's hx of dementia.     Current Facility-Administered Medications   Medication Dose Route Frequency Provider Last Rate Last Dose    sodium chloride (NS) flush 5-10 mL  5-10 mL IntraVENous PRN Gabby Castorena MD        sodium chloride 0.9 % bolus infusion 2,040 mL  30 mL/kg IntraVENous ONCE Gabby Castorena MD        sodium chloride (NS) flush 5-10 mL  5-10 mL IntraVENous Q8H Negro Aragon MD        sodium chloride (NS) flush 5-10 mL  5-10 mL IntraVENous PRN Michela Apgar, MD        acetaminophen (TYLENOL) tablet 650 mg  650 mg Oral Q6H PRN Michela Apgar, MD        heparin (porcine) injection 5,000 Units  5,000 Units SubCUTAneous Mathew Mcbride MD   5,000 Units at 17    0.9% sodium chloride infusion  50 mL/hr IntraVENous CONTINUOUS Michela Apgar, MD        [START ON 2017] allopurinol (ZYLOPRIM) tablet 100 mg  100 mg Oral DAILY Michela Apgar, MD        [START ON 2017] amLODIPine (NORVASC) tablet 5 mg  5 mg Oral DAILY Michela Apgar, MD        [START ON 2017] atorvastatin (LIPITOR) tablet 40 mg  40 mg Oral DAILY Michela Apgar, MD        [START ON 2017] clopidogrel (PLAVIX) tablet 75 mg  75 mg Oral DAILY Michela Apgar, MD        [START ON 2017] levothyroxine (SYNTHROID) tablet 88 mcg  88 mcg Oral ACB Michela Apgar, MD        [START ON 2017] famotidine (PEPCID) tablet 20 mg  20 mg Oral DAILY Michela Apgar, MD        sodium chloride (NS) flush 5-10 mL  5-10 mL IntraVENous Q8H Michela Apgar, MD        sodium chloride (NS) flush 5-10 mL  5-10 mL IntraVENous PRN Michela Apgar, MD        metoprolol (LOPRESSOR) injection 5 mg  5 mg IntraVENous Q4H Michela Apgar, MD   Stopped at 17    albuterol-ipratropium (DUO-NEB) 2.5 MG-0.5 MG/3 ML  3 mL Nebulization Q6H RT Michela Apgar, MD        [START ON 2017] furosemide (LASIX) injection 40 mg  40 mg IntraVENous DAILY Michela Apgar, MD         Current Outpatient Prescriptions   Medication Sig Dispense Refill    furosemide (LASIX) 80 mg tablet Take 160 mg by mouth two (2) times a day.  metoprolol tartrate 75 mg tab Take 75 mg by mouth two (2) times a day.  insulin lispro (HUMALOG) 100 unit/mL injection 4 Units by SubCUTAneous route three (3) times daily.  'Hold if Blood sugar is below 120 - Notify Physician of Blood Sugar <60 and >350.'  'If above 350 give a total of 7 units.'      insulin NPH (HUMULIN N) 100 unit/mL injection 16 Units by SubCUTAneous route two (2) times a day. 'Hold for Blood Sugars below 120. Notify physician of Blood sugar <60 and >350.'      calcium carbonate (OYSTER SHELL CALCIUM) 500 mg calcium (1,250 mg) tablet Take 1 Tab by mouth two (2) times a day.  allopurinol (ZYLOPRIM) 100 mg tablet Take 1 Tab by mouth daily. 90 Tab 3    amLODIPine (NORVASC) 5 mg tablet Take 5 mg by mouth daily.  clopidogrel (PLAVIX) 75 mg tablet Take 75 mg by mouth daily.  atorvastatin (LIPITOR) 40 mg tablet Take 40 mg by mouth daily.  ranitidine (ZANTAC) 150 mg tablet Take 150 mg by mouth two (2) times a day.  levothyroxine (LEVOXYL) 88 mcg tablet Take 88 mcg by mouth daily (before breakfast).  acetaminophen (TYLENOL) 500 mg tablet Take 1,000 mg by mouth every four (4) hours as needed for Pain.  magnesium hydroxide (KING MILK OF MAGNESIA) 400 mg/5 mL suspension Take 30 mL by mouth daily as needed for Constipation.  glucagon (GLUCAGEN) 1 mg injection 1 mg by IntraVENous route as needed ('Hypoglycemia'). 'Give if blood sugar <60 and unconscious and call 911.  If no response in 15 minutes repeat.'         Past History     Past Medical History:  Past Medical History:   Diagnosis Date    CAD (coronary artery disease)     Chronic kidney disease (CKD), stage III (moderate)     GFR ~ 37 at baseline    Dementia     Diabetes ([facility]     Type 2 with renal manifestations    Gout     Hyperlipidemia     Hypertension     Hypothyroidism     Other ill-defined conditions(799.89)     sleep apnea    Other ill-defined conditions(799.89)     Cardiomyopathy    Syncope     Low blood sugar       Past Surgical History:  Past Surgical History:   Procedure Laterality Date    CARDIAC SURG PROCEDURE UNLIST      defibrillator    HX ORTHOPAEDIC      knee surgery    HX PACEMAKER  09/23/2004    / dual lead       Family History:  Family History   Problem Relation Age of Onset    Heart Disease Father        Social History:  Social History   Substance Use Topics    Smoking status: Never Smoker    Smokeless tobacco: Never Used    Alcohol use No       Allergies:  No Known Allergies      Review of Systems   Review of Systems   Unable to perform ROS: Dementia       Physical Exam   Physical Exam   Constitutional: She appears well-developed and well-nourished. No distress. HENT:   Head: Normocephalic and atraumatic. Eyes: EOM are normal. Right eye exhibits no discharge. Left eye exhibits no discharge. No scleral icterus. Neck: Normal range of motion. Neck supple. No tracheal deviation present. Cardiovascular: Normal rate, regular rhythm, normal heart sounds and intact distal pulses. Exam reveals no gallop and no friction rub. No murmur heard. Pulmonary/Chest: Effort normal and breath sounds normal. No respiratory distress. Crackles in the inferior lung bases bilaterally. Faint scattered wheezes. Abdominal: Soft. She exhibits no distension. There is no tenderness. Musculoskeletal: Normal range of motion. She exhibits no edema. 2+ edema bilateral lower extremities. Lymphadenopathy:     She has no cervical adenopathy. Neurological: She is alert. Pt is non-verbal therefor unable to assess orientation. Pt is following commands and moving all extremities equally. Skin: Skin is warm and dry. No rash noted. Psychiatric: She has a normal mood and affect. Nursing note and vitals reviewed.     Diagnostic Study Results     Labs -     Recent Results (from the past 12 hour(s))   GLUCOSE, POC    Collection Time: 12/29/17  5:40 PM   Result Value Ref Range    Glucose (POC) 253 (H) 65 - 100 mg/dL    Performed by Wiley London    EKG, 12 LEAD, INITIAL    Collection Time: 12/29/17  6:04 PM   Result Value Ref Range    Ventricular Rate 79 BPM    Atrial Rate 79 BPM    P-R Interval 180 ms    QRS Duration 100 ms    Q-T Interval 422 ms    QTC Calculation (Bezet) 483 ms    Calculated P Axis 35 degrees    Calculated R Axis -34 degrees    Calculated T Axis 69 degrees    Diagnosis       Normal sinus rhythm  Left axis deviation  Minimal voltage criteria for LVH, may be normal variant  Anterior infarct (cited on or before 29-DEC-2017)  When compared with ECG of 12-DEC-2017 11:24,  No significant change was found     METABOLIC PANEL, COMPREHENSIVE    Collection Time: 12/29/17  6:16 PM   Result Value Ref Range    Sodium 138 136 - 145 mmol/L    Potassium 4.3 3.5 - 5.1 mmol/L    Chloride 101 97 - 108 mmol/L    CO2 27 21 - 32 mmol/L    Anion gap 10 5 - 15 mmol/L    Glucose 243 (H) 65 - 100 mg/dL    BUN 41 (H) 6 - 20 MG/DL    Creatinine 2.32 (H) 0.55 - 1.02 MG/DL    BUN/Creatinine ratio 18 12 - 20      GFR est AA 24 (L) >60 ml/min/1.73m2    GFR est non-AA 20 (L) >60 ml/min/1.73m2    Calcium 8.6 8.5 - 10.1 MG/DL    Bilirubin, total 1.0 0.2 - 1.0 MG/DL    ALT (SGPT) 17 12 - 78 U/L    AST (SGOT) 22 15 - 37 U/L    Alk. phosphatase 166 (H) 45 - 117 U/L    Protein, total 7.9 6.4 - 8.2 g/dL    Albumin 3.1 (L) 3.5 - 5.0 g/dL    Globulin 4.8 (H) 2.0 - 4.0 g/dL    A-G Ratio 0.6 (L) 1.1 - 2.2     CBC WITH AUTOMATED DIFF    Collection Time: 12/29/17  6:16 PM   Result Value Ref Range    WBC 7.8 3.6 - 11.0 K/uL    RBC 3.95 3.80 - 5.20 M/uL    HGB 12.2 11.5 - 16.0 g/dL    HCT 37.5 35.0 - 47.0 %    MCV 94.9 80.0 - 99.0 FL    MCH 30.9 26.0 - 34.0 PG    MCHC 32.5 30.0 - 36.5 g/dL    RDW 15.9 (H) 11.5 - 14.5 %    PLATELET 701 915 - 085 K/uL    NEUTROPHILS 70 %    BAND NEUTROPHILS 21 %    LYMPHOCYTES 5 %    MONOCYTES 3 %    EOSINOPHILS 0 %    BASOPHILS 0 %    METAMYELOCYTES 1 %    ABS. NEUTROPHILS 7.1 K/UL    ABS. LYMPHOCYTES 0.4 K/UL    ABS. MONOCYTES 0.2 K/UL    ABS. EOSINOPHILS 0.0 K/UL    ABS.  BASOPHILS 0.0 K/UL    RBC COMMENTS NORMOCYTIC, NORMOCHROMIC     LACTIC ACID    Collection Time: 12/29/17  6:16 PM   Result Value Ref Range    Lactic acid 5.0 (HH) 0.4 - 2.0 MMOL/L   METABOLIC PANEL, BASIC Collection Time: 12/29/17  8:40 PM   Result Value Ref Range    Sodium 140 136 - 145 mmol/L    Potassium 4.1 3.5 - 5.1 mmol/L    Chloride 103 97 - 108 mmol/L    CO2 27 21 - 32 mmol/L    Anion gap 10 5 - 15 mmol/L    Glucose 233 (H) 65 - 100 mg/dL    BUN 42 (H) 6 - 20 MG/DL    Creatinine 2.29 (H) 0.55 - 1.02 MG/DL    BUN/Creatinine ratio 18 12 - 20      GFR est AA 24 (L) >60 ml/min/1.73m2    GFR est non-AA 20 (L) >60 ml/min/1.73m2    Calcium 8.1 (L) 8.5 - 10.1 MG/DL   LACTIC ACID    Collection Time: 12/29/17  8:40 PM   Result Value Ref Range    Lactic acid 3.2 (HH) 0.4 - 2.0 MMOL/L       Radiologic Studies -     CXR Results  (Last 48 hours)               12/29/17 1856  XR CHEST SNGL V Final result    Impression:  IMPRESSION:   New right basilar airspace disease may represent pneumonia or aspiration. Narrative:  INDICATION: Aspiration       COMPARISON: December 12, 2017       FINDINGS: AP portable imaging of the chest performed at 6:40 PM demonstrates a   stable cardiomediastinal silhouette. Left-sided AICD is unchanged in position. There is new airspace disease at the right lung base. No significant osseous   abnormalities are seen. Medical Decision Making   I am the first provider for this patient. I reviewed the vital signs, available nursing notes, past medical history, past surgical history, family history and social history. Vital Signs-Reviewed the patient's vital signs. Patient Vitals for the past 12 hrs:   Temp Pulse Resp BP SpO2   12/29/17 2134 - 84 18 - 94 %   12/29/17 2130 - 80 25 (!) 110/35 -   12/29/17 2116 - 82 22 - 94 %   12/29/17 2115 - 79 22 102/77 90 %   12/29/17 2047 - 81 18 - 94 %   12/29/17 2045 - 80 22 92/74 90 %   12/29/17 2031 - 79 18 129/41 94 %   12/29/17 1733 - - - - 93 %   12/29/17 1718 98.1 °F (36.7 °C) - 24 124/46 (!) 88 %     EKG interpretation: (Preliminary) 1804  Rhythm: normal sinus rhythm; and regular . Rate (approx.): 79;  Axis: left axis deviation; DC interval: normal; QRS interval: Poor QRS progression; ST/T wave: normal; Other findings: Q wave anterior leads. Written by Brisa Martin, EDITH Scribe, as dictated by Terese Sierra MD.    Records Reviewed: Nursing Notes and Old Medical Records    Provider Notes (Medical Decision Making):   Differential includes aspiration pneumonia, pneumonia, heart failure, pulmonary edema, pleural effusion, ACS, PE, viral syndrome, electrolyte abnormality  - CBC, CMP, lactate, troponin, pro-BNP, UA  - Blood cultures  - CXR    Disposition: CXR consistent with aspiration pneumonia. Patient is afebrile without leukocytosis but does have new O2 requirement. Lactate also elevated. Will treat with IVF, abx and admit for further management. ED Course:   Initial assessment performed. The patients presenting problems have been discussed, and they are in agreement with the care plan formulated and outlined with them. I have encouraged them to ask questions as they arise throughout their visit. CONSULT NOTE:   7:33 PM  Terese Sierra MD spoke with Joshua Rowan MD,   Specialty: Hospitalist  Discussed pt's hx, disposition, and available diagnostic and imaging results. Reviewed care plans. Consultant will evaluate pt for admission. Written by Brisa Martin, ED Scribe, as dictated by Terese Sierra MD.    Critical Care Time:     CRITICAL CARE NOTE :    8:00 PM    IMPENDING DETERIORATION - Respiratory  ASSOCIATED RISK FACTORS - Hypoxia  MANAGEMENT- Bedside Assessment and Supervision of Care  INTERPRETATION -  Xrays, ECG and Blood Pressure  INTERVENTIONS - Oxygen, antibiotics  CASE REVIEW - Hospitalist, Nursing and Family  TREATMENT RESPONSE -Stable  PERFORMED BY - Self    NOTES:  I have spent 40 minutes of critical care time involved in lab review, consultations with specialist, family decision- making, bedside attention and documentation.  During this entire length of time I was immediately available to the patient Brie Paris MD      Disposition:    ADMIT NOTE:  10:00 PM  The patient is being admitted to the hospital by Davian Laughlin MD.  The results of their tests and reasons for their admission have been discussed with the patient and/or available family. They convey agreement and understanding for the need to be admitted and for their admission diagnosis. PLAN:  1. Admit to hospitalist     Diagnosis     Clinical Impression:   1. Acute respiratory failure with hypoxia ([address]    2. Aspiration pneumonia of right lower lobe due to vomit ([address]    3. Lactic acidosis        Attestations: This note is prepared by Sixto Hernandez, acting as Scribe for Elis Reid MD.    Elis Reid MD: The scribe's documentation has been prepared under my direction and personally reviewed by me in its entirety. I confirm that the note above accurately reflects all work, treatment, procedures, and medical decision making performed by me.

## 2017-12-30 PROBLEM — E87.20 LACTIC ACIDOSIS: Status: ACTIVE | Noted: 2017-12-30

## 2017-12-30 PROBLEM — F03.90 DEMENTIA (HCC): Status: ACTIVE | Noted: 2017-12-30

## 2017-12-30 PROBLEM — J69.0 ASPIRATION PNEUMONIA (HCC): Status: ACTIVE | Noted: 2017-12-30

## 2017-12-30 LAB
APPEARANCE UR: ABNORMAL
ATRIAL RATE: 79 BPM
BACTERIA URNS QL MICRO: ABNORMAL /HPF
BILIRUB UR QL: NEGATIVE
CALCULATED P AXIS, ECG09: 35 DEGREES
CALCULATED R AXIS, ECG10: -34 DEGREES
CALCULATED T AXIS, ECG11: 69 DEGREES
COLOR UR: ABNORMAL
DIAGNOSIS, 93000: NORMAL
EPITH CASTS URNS QL MICRO: ABNORMAL /LPF
ERYTHROCYTE [DISTWIDTH] IN BLOOD BY AUTOMATED COUNT: 16.1 % (ref 11.5–14.5)
EST. AVERAGE GLUCOSE BLD GHB EST-MCNC: 160 MG/DL
GLUCOSE BLD STRIP.AUTO-MCNC: 105 MG/DL (ref 65–100)
GLUCOSE BLD STRIP.AUTO-MCNC: 179 MG/DL (ref 65–100)
GLUCOSE BLD STRIP.AUTO-MCNC: 263 MG/DL (ref 65–100)
GLUCOSE UR STRIP.AUTO-MCNC: NEGATIVE MG/DL
HBA1C MFR BLD: 7.2 % (ref 4.2–6.3)
HCT VFR BLD AUTO: 34.1 % (ref 35–47)
HGB BLD-MCNC: 10.9 G/DL (ref 11.5–16)
HGB UR QL STRIP: ABNORMAL
HYALINE CASTS URNS QL MICRO: ABNORMAL /LPF (ref 0–5)
KETONES UR QL STRIP.AUTO: NEGATIVE MG/DL
LACTATE SERPL-SCNC: 1.9 MMOL/L (ref 0.4–2)
LACTATE SERPL-SCNC: 3.8 MMOL/L (ref 0.4–2)
LEUKOCYTE ESTERASE UR QL STRIP.AUTO: ABNORMAL
MCH RBC QN AUTO: 31 PG (ref 26–34)
MCHC RBC AUTO-ENTMCNC: 32 G/DL (ref 30–36.5)
MCV RBC AUTO: 96.9 FL (ref 80–99)
NITRITE UR QL STRIP.AUTO: NEGATIVE
P-R INTERVAL, ECG05: 180 MS
PH UR STRIP: 5.5 [PH] (ref 5–8)
PLATELET # BLD AUTO: 270 K/UL (ref 150–400)
PROT UR STRIP-MCNC: 100 MG/DL
Q-T INTERVAL, ECG07: 422 MS
QRS DURATION, ECG06: 100 MS
QTC CALCULATION (BEZET), ECG08: 483 MS
RBC # BLD AUTO: 3.52 M/UL (ref 3.8–5.2)
RBC #/AREA URNS HPF: ABNORMAL /HPF (ref 0–5)
SERVICE CMNT-IMP: ABNORMAL
SP GR UR REFRACTOMETRY: 1.01 (ref 1–1.03)
UA: UC IF INDICATED,UAUC: ABNORMAL
UROBILINOGEN UR QL STRIP.AUTO: 0.2 EU/DL (ref 0.2–1)
VENTRICULAR RATE, ECG03: 79 BPM
WBC # BLD AUTO: 12.6 K/UL (ref 3.6–11)
WBC URNS QL MICRO: >100 /HPF (ref 0–4)

## 2017-12-30 PROCEDURE — 77030038269 HC DRN EXT URIN PURWCK BARD -A

## 2017-12-30 PROCEDURE — 87086 URINE CULTURE/COLONY COUNT: CPT | Performed by: INTERNAL MEDICINE

## 2017-12-30 PROCEDURE — 87186 SC STD MICRODIL/AGAR DIL: CPT | Performed by: INTERNAL MEDICINE

## 2017-12-30 PROCEDURE — 74011250636 HC RX REV CODE- 250/636: Performed by: INTERNAL MEDICINE

## 2017-12-30 PROCEDURE — 81001 URINALYSIS AUTO W/SCOPE: CPT | Performed by: EMERGENCY MEDICINE

## 2017-12-30 PROCEDURE — 65660000000 HC RM CCU STEPDOWN

## 2017-12-30 PROCEDURE — 82962 GLUCOSE BLOOD TEST: CPT

## 2017-12-30 PROCEDURE — 74011000250 HC RX REV CODE- 250: Performed by: INTERNAL MEDICINE

## 2017-12-30 PROCEDURE — 77010033678 HC OXYGEN DAILY

## 2017-12-30 PROCEDURE — 94640 AIRWAY INHALATION TREATMENT: CPT

## 2017-12-30 PROCEDURE — 74011250636 HC RX REV CODE- 250/636: Performed by: EMERGENCY MEDICINE

## 2017-12-30 PROCEDURE — 74011636637 HC RX REV CODE- 636/637: Performed by: INTERNAL MEDICINE

## 2017-12-30 PROCEDURE — 36415 COLL VENOUS BLD VENIPUNCTURE: CPT | Performed by: INTERNAL MEDICINE

## 2017-12-30 PROCEDURE — 83605 ASSAY OF LACTIC ACID: CPT | Performed by: INTERNAL MEDICINE

## 2017-12-30 PROCEDURE — 83036 HEMOGLOBIN GLYCOSYLATED A1C: CPT | Performed by: INTERNAL MEDICINE

## 2017-12-30 PROCEDURE — 87077 CULTURE AEROBIC IDENTIFY: CPT | Performed by: INTERNAL MEDICINE

## 2017-12-30 PROCEDURE — 74011000258 HC RX REV CODE- 258: Performed by: INTERNAL MEDICINE

## 2017-12-30 PROCEDURE — 85027 COMPLETE CBC AUTOMATED: CPT | Performed by: INTERNAL MEDICINE

## 2017-12-30 RX ORDER — INSULIN LISPRO 100 [IU]/ML
INJECTION, SOLUTION INTRAVENOUS; SUBCUTANEOUS
Status: DISCONTINUED | OUTPATIENT
Start: 2017-12-30 | End: 2018-01-05 | Stop reason: HOSPADM

## 2017-12-30 RX ORDER — DEXTROSE 50 % IN WATER (D50W) INTRAVENOUS SYRINGE
12.5-25 AS NEEDED
Status: DISCONTINUED | OUTPATIENT
Start: 2017-12-30 | End: 2018-01-05 | Stop reason: ALTCHOICE

## 2017-12-30 RX ORDER — IPRATROPIUM BROMIDE AND ALBUTEROL SULFATE 2.5; .5 MG/3ML; MG/3ML
3 SOLUTION RESPIRATORY (INHALATION)
Status: DISCONTINUED | OUTPATIENT
Start: 2017-12-30 | End: 2018-01-01

## 2017-12-30 RX ORDER — LORAZEPAM 2 MG/ML
0.5 INJECTION INTRAMUSCULAR ONCE
Status: COMPLETED | OUTPATIENT
Start: 2017-12-30 | End: 2017-12-30

## 2017-12-30 RX ORDER — MAGNESIUM SULFATE 100 %
4 CRYSTALS MISCELLANEOUS AS NEEDED
Status: DISCONTINUED | OUTPATIENT
Start: 2017-12-30 | End: 2018-01-05 | Stop reason: ALTCHOICE

## 2017-12-30 RX ADMIN — INSULIN LISPRO 2 UNITS: 100 INJECTION, SOLUTION INTRAVENOUS; SUBCUTANEOUS at 17:36

## 2017-12-30 RX ADMIN — METOPROLOL TARTRATE 5 MG: 5 INJECTION, SOLUTION INTRAVENOUS at 17:28

## 2017-12-30 RX ADMIN — METOPROLOL TARTRATE 5 MG: 5 INJECTION, SOLUTION INTRAVENOUS at 08:58

## 2017-12-30 RX ADMIN — Medication 10 ML: at 04:43

## 2017-12-30 RX ADMIN — SODIUM CHLORIDE 50 ML/HR: 900 INJECTION, SOLUTION INTRAVENOUS at 20:23

## 2017-12-30 RX ADMIN — INSULIN LISPRO 2 UNITS: 100 INJECTION, SOLUTION INTRAVENOUS; SUBCUTANEOUS at 12:44

## 2017-12-30 RX ADMIN — HEPARIN SODIUM 5000 UNITS: 5000 INJECTION, SOLUTION INTRAVENOUS; SUBCUTANEOUS at 04:43

## 2017-12-30 RX ADMIN — METOPROLOL TARTRATE 5 MG: 5 INJECTION, SOLUTION INTRAVENOUS at 20:19

## 2017-12-30 RX ADMIN — METOPROLOL TARTRATE 5 MG: 5 INJECTION, SOLUTION INTRAVENOUS at 00:00

## 2017-12-30 RX ADMIN — HEPARIN SODIUM 5000 UNITS: 5000 INJECTION, SOLUTION INTRAVENOUS; SUBCUTANEOUS at 20:18

## 2017-12-30 RX ADMIN — HEPARIN SODIUM 5000 UNITS: 5000 INJECTION, SOLUTION INTRAVENOUS; SUBCUTANEOUS at 12:44

## 2017-12-30 RX ADMIN — SODIUM CHLORIDE 3.38 G: 900 INJECTION, SOLUTION INTRAVENOUS at 09:08

## 2017-12-30 RX ADMIN — Medication 10 ML: at 14:10

## 2017-12-30 RX ADMIN — LORAZEPAM 0.5 MG: 2 INJECTION INTRAMUSCULAR; INTRAVENOUS at 18:32

## 2017-12-30 RX ADMIN — PIPERACILLIN SODIUM AND TAZOBACTAM SODIUM 3.38 G: .375; 3 INJECTION, POWDER, LYOPHILIZED, FOR SOLUTION INTRAVENOUS at 20:25

## 2017-12-30 RX ADMIN — FUROSEMIDE 40 MG: 10 INJECTION, SOLUTION INTRAMUSCULAR; INTRAVENOUS at 08:58

## 2017-12-30 RX ADMIN — METOPROLOL TARTRATE 5 MG: 5 INJECTION, SOLUTION INTRAVENOUS at 04:43

## 2017-12-30 RX ADMIN — METOPROLOL TARTRATE 5 MG: 5 INJECTION, SOLUTION INTRAVENOUS at 23:16

## 2017-12-30 RX ADMIN — INSULIN LISPRO 5 UNITS: 100 INJECTION, SOLUTION INTRAVENOUS; SUBCUTANEOUS at 09:02

## 2017-12-30 RX ADMIN — METOPROLOL TARTRATE 5 MG: 5 INJECTION, SOLUTION INTRAVENOUS at 12:44

## 2017-12-30 RX ADMIN — IPRATROPIUM BROMIDE AND ALBUTEROL SULFATE 3 ML: .5; 3 SOLUTION RESPIRATORY (INHALATION) at 01:09

## 2017-12-30 RX ADMIN — Medication 10 ML: at 20:18

## 2017-12-30 RX ADMIN — IPRATROPIUM BROMIDE AND ALBUTEROL SULFATE 3 ML: .5; 3 SOLUTION RESPIRATORY (INHALATION) at 19:32

## 2017-12-30 NOTE — PROGRESS NOTES
[address] [place] [address]  [phone]      Medical Progress Note      NAME: Ana England   :  10/31/1924  MRM:  [id number]    Date/Time: 2017  7:04 AM         Subjective:     Admitted with acute respiratory failure, RLL aspiration pneumonia with h/o large hiatal hernia, lactic acidosis and dementia. Afebrile and in NAD    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    Gout     Hyperlipidemia     Hypertension     Hypothyroidism     Other ill-defined conditions(799.89)     sleep apnea    Other ill-defined conditions(799.89)     Cardiomyopathy    Syncope     Low blood sugar       ROS:  Patient was not able to provide review of systems due to dementia         Objective:       Vitals:        Last 24hrs VS reviewed since prior progress note. Most recent are:    Visit Vitals    /41    Pulse 84    Temp 97.4 °F (36.3 °C)    Resp 20    Ht 5' 1\" (1.549 m)    Wt 133 lb 11.2 oz (60.6 kg)    SpO2 98%    BMI 25.26 kg/m2     SpO2 Readings from Last 6 Encounters:   17 98%   17 95%   17 98%   16 91%   16 96%   09/02/15 98%    O2 Flow Rate (L/min): 2 l/min     Intake/Output Summary (Last 24 hours) at 17 0704  Last data filed at 17 0440   Gross per 24 hour   Intake                0 ml   Output              350 ml   Net             -350 ml        Telemetry reviewed:   normal sinus rhythm  Tubes:   N/A  X-Ray:  Admission chest xray - New right basilar airspace disease may represent pneumonia or aspiration  Procedures:   N/A    Exam:     General   Obese WF, in NAD  Neck   Supple without nodes or mass.  No thyromegaly or bruit  Respiratory   Rales right base without wheezing  Cardiology  Regular Rate and Rythmn  - no murmurs, rubs or gallops  Abdominal  Soft, non-tender, non-distended, positive bowel sounds, no hepatosplenomegaly  Extremities  No clubbing, cyanosis, or edema. Pulses intact. Skin  Normal skin turgor.   No rashes or skin ulcers noted  Neurological  No focal neurological deficits noted  Psychological  Oriented to name  Exam otherwise negative     Lab Data Reviewed: (see below)    Medications Reviewed: (see below)    ______________________________________________________________________    Medications:     Current Facility-Administered Medications   Medication Dose Route Frequency    sodium chloride (NS) flush 5-10 mL  5-10 mL IntraVENous PRN    sodium chloride (NS) flush 5-10 mL  5-10 mL IntraVENous Q8H    sodium chloride (NS) flush 5-10 mL  5-10 mL IntraVENous PRN    acetaminophen (TYLENOL) tablet 650 mg  650 mg Oral Q6H PRN    heparin (porcine) injection 5,000 Units  5,000 Units SubCUTAneous Q8H    0.9% sodium chloride infusion  50 mL/hr IntraVENous CONTINUOUS    allopurinol (ZYLOPRIM) tablet 100 mg  100 mg Oral DAILY    amLODIPine (NORVASC) tablet 5 mg  5 mg Oral DAILY    atorvastatin (LIPITOR) tablet 40 mg  40 mg Oral DAILY    clopidogrel (PLAVIX) tablet 75 mg  75 mg Oral DAILY    levothyroxine (SYNTHROID) tablet 88 mcg  88 mcg Oral ACB    famotidine (PEPCID) tablet 20 mg  20 mg Oral DAILY    sodium chloride (NS) flush 5-10 mL  5-10 mL IntraVENous Q8H    sodium chloride (NS) flush 5-10 mL  5-10 mL IntraVENous PRN    metoprolol (LOPRESSOR) injection 5 mg  5 mg IntraVENous Q4H    albuterol-ipratropium (DUO-NEB) 2.5 MG-0.5 MG/3 ML  3 mL Nebulization Q6H RT    furosemide (LASIX) injection 40 mg  40 mg IntraVENous DAILY            Lab Review:     Recent Labs      12/30/17   0142  12/29/17   1816   WBC  12.6*  7.8   HGB  10.9*  12.2   HCT  34.1*  37.5   PLT  270  285     Recent Labs      12/29/17   2040  12/29/17   1816   NA  140  138   K  4.1  4.3   CL  103  101   CO2  27  27   GLU  233*  243*   BUN  42*  41*   CREA 2.29*  2.32*   CA  8.1*  8.6   MG  2.4   --    ALB   --   3.1*   TBILI   --   1.0   SGOT   --   22   ALT   --   17     Lab Results   Component Value Date/Time    Glucose (POC) 253 12/29/2017 05:40 PM    Glucose (POC) 244 07/31/2017 04:55 PM    Glucose (POC) 213 01/29/2016 08:05 AM    Glucose (POC) 414 01/28/2016 09:34 PM    Glucose (POC) 423 01/28/2016 09:32 PM     No results for input(s): PH, PCO2, PO2, HCO3, FIO2 in the last 72 hours. No results for input(s): INR in the last 72 hours. No lab exists for component: INREXT         Assessment:     Principal Problem:    Acute respiratory failure ([facility] (12/29/2017)    Active Problems:    DM (diabetes mellitus), type 2, uncontrolled, with renal complications ([facility] (3/55/7431)      CKD (chronic kidney disease) stage 3, GFR 30-59 ml/min (8/23/2010)      Aspiration pneumonia ([facility] (12/30/2017)      Lactic acidosis (12/30/2017)      Dementia (12/30/2017)           Plan:     Risk of deterioration: medium             1. Continue slow IV hydration  2. No antibiotic currently ordered - will start zosyn  3. Jet nebs and O2 as needed  4. Await cultures  5. NPO until SLT evaluation   6.  - will order SSI coverage  7.  DNR noted     Care Plan discussed with: Nursing    Discussed:  Care Plan    Prophylaxis:  Hep SQ    Disposition:  Home w/Family           ___________________________________________________    Attending Physician: Glenis Fontaine MD

## 2017-12-30 NOTE — PROGRESS NOTES
Pharmacy Automatic Renal Dosing Protocol - Antimicrobials    Indication for Antimicrobials: RLL aspiration pneumonia     Current Regimen of Each Antimicrobial:  Zosyn 3.375 g Q8 hours (Start Date ; Day # 2)    Previous Antimicrobial Therapy: none    Significant Cultures:   - Blood: NGx12 hours  - Urine: pending    Paralysis, amputations, malnutrition: n/a    Labs:  Recent Labs      17   0142  17   2040  17   1816   CREA   --   2.29*  2.32*   BUN   --   42*  41*   WBC  12.6*   --   7.8     Temp (24hrs), Av.8 °F (36.6 °C), Min:97.4 °F (36.3 °C), Max:98.1 °F (36.7 °C)      Creatinine Clearance (mL/min) or Dialysis: ~12 ml/min  No results found for: PCT    Impression/Plan: Patient with CrCl <20 ml/min this AM, will change dose to 3.375 g Q12 hours per renal dosing protocol  - BMP and CBC ordered daily     Pharmacy will follow daily and adjust medications as appropriate for renal function and/or serum levels. Thank you,  FIONA Centeno          Recommended duration of therapy  http://[facility]/[place]/[facility]/Pharmacy/Clinical%20Companion/Duration%20of%20ABX%20therapy. docx    Renal Dosing  http://[facility]/[place]/[facility]/Pharmacy/Clinical%20Companion/Renal%[id number]. pdf

## 2017-12-30 NOTE — ED NOTES
TRANSFER - OUT REPORT:    Verbal report given to Colette SANCHEZRN(name) on José Antonio Ball  being transferred to [facility](unit) for routine progression of care       Report consisted of patients Situation, Background, Assessment and   Recommendations(SBAR). Information from the following report(s) SBAR, Kardex, ED Summary, [facility] and Recent Results was reviewed with the receiving nurse. Lines:   Peripheral IV 12/29/17 Right Antecubital (Active)   Site Assessment Clean, dry, & intact 12/29/2017  6:00 PM   Phlebitis Assessment 0 12/29/2017  6:00 PM   Infiltration Assessment 0 12/29/2017  6:00 PM   Dressing Status Clean, dry, & intact 12/29/2017  6:00 PM   Dressing Type Transparent 12/29/2017  6:00 PM   Hub Color/Line Status Blue;Capped;Flushed 12/29/2017  6:00 PM        Opportunity for questions and clarification was provided.       Patient transported with:   [employer]

## 2017-12-30 NOTE — PROGRESS NOTES
Pharmacy  Enoxaparin (Lovenox®) Monitoring/Dosing      Indication: DVT prophylaxis     Current Dose: Enoxaparin 40 subcutaneously every 24 hours    Creatinine Clearance (mL/min): 11.4    Current Weight: 68    Labs:  Recent Labs      12/29/17   1816   CREA  2.32*   HGB  12.2   PLT  285     Wt Readings from Last 1 Encounters:   12/29/17 68 kg (150 lb)     Ht Readings from Last 1 Encounters:   12/29/17 154.9 cm (61\")       Impression/Plan:   Enoxaparin converted to heparin 5000 units Q8H per anticoagulation policy. Thanks,  Marlo Lindsey [facility]      http://[facility]/[place]/[facility]/Pharmacy/Clinical%20Companion/Enoxaparin%20Dose%20Adjustment%20Protocol. pdf

## 2017-12-30 NOTE — H&P
Hospitalist Admission Note    NAME: Lazarus Ocampo   :  10/31/1924   MRN:  [id number]     Date/Time:  2017 8:10 PM    Patient PCP: Martita Balderas MD  ________________________________________________________________________    My assessment of this patient's clinical condition and my plan of care is as follows. Assessment / Plan:  Acute Respiratory Failure, POA  Aspiration Pneumonia, POA  Lactic Acidosis  Moderately Large Hiatal Hernia, POA  Dementia, POA  Diabetes Mellitus  CKD stage 3  HTN, CAD with Pacer,  Chronic Systolic CHF EF of 25%  Hyperlipidemia  Hypothyroidism  GOUT    PLAN    PAtient is frail, elderly and is at high risk of further and rapid deterioration and therefore admission to inpatient setting for  Management and treatment. Admit to Telemetry  O2 titrate to keep Sats > 92,  Aspiration precautions, keep HOB> 45during sleep  NPO, until screened by Speech, PPI for  Hiatal Hernia/ GERD, prn zofran  Zosyn for  Aspiration PNA, DUonebs prn for bronchospasm, Serial Lactate level, DO NOT BOLUS FLUIDS due to EF of 35%  HOLD PO Meds, IF hydralazine for BP prn  Gentle IV hydration while NPO,    Insulin Sliding Scale and Glucose checks QID      PCP Dr. Rubi Sharpe will talke over patient's care in am      Code Status: DNR  Surrogate Decision Maker:       DVT Prophylaxis: HEPARIN due to CKD  GI Prophylaxis: not indicated    Baseline: Lives in [place], Demented, walks with a Rollator walker        Subjective:   CHIEF COMPLAINT: SOB    HISTORY OF PRESENT ILLNESS:     Lazarus Ocampo is a 80 y.o.  female who presents with SOB and low Sats. Patient has known GERD, dementia, CAD, DM , HTN , Mod size Hiatal hernia by CT 2 weeks ago is brought   From [facility] due to low Sats.  She apparently had a Vomiting episode earlier today for which she received  Phenergan however later she is noted to be congested, and had her sats low in the 80's requiring O2 to be initiated and Patient to be brought to the hospital. Her CXR shows a RLL PNA    We were asked to admit for work up and evaluation of the above problems. 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    Gout     Hyperlipidemia     Hypertension     Hypothyroidism     Other ill-defined conditions(799.89)     sleep apnea    Other ill-defined conditions(799.89)     Cardiomyopathy    Syncope     Low blood sugar        Past Surgical History:   Procedure Laterality Date    CARDIAC SURG PROCEDURE UNLIST      defibrillator    HX ORTHOPAEDIC      knee surgery    HX PACEMAKER  09/23/2004    / dual lead       Social History   Substance Use Topics    Smoking status: Never Smoker    Smokeless tobacco: Never Used    Alcohol use No        Family History   Problem Relation Age of Onset    Heart Disease Father      No Known Allergies     Prior to Admission medications    Medication Sig Start Date End Date Taking? Authorizing Provider   allopurinol (ZYLOPRIM) 100 mg tablet Take 1 Tab by mouth daily. 1/29/16   Suyapa Garvin II, MD   amLODIPine (NORVASC) 5 mg tablet Take 5 mg by mouth daily. Historical Provider   clopidogrel (PLAVIX) 75 mg tablet Take  by mouth daily. Historical Provider   atorvastatin (LIPITOR) 40 mg tablet Take 40 mg by mouth daily. Historical Provider   ranitidine (ZANTAC) 150 mg tablet Take 150 mg by mouth two (2) times a day. Ehsan Matias MD   levothyroxine (LEVOXYL) 88 mcg tablet Take 88 mcg by mouth daily (before breakfast). Ehsan Matias MD       REVIEW OF SYSTEMS:     I am not able to complete the review of systems because:    The patient is intubated and sedated   y The patient has altered mental status due to his acute medical problems    The patient has baseline aphasia from prior stroke(s)    The patient has baseline dementia and is not reliable historian    The patient is in acute medical distress and unable to provide information           Total of 12 systems reviewed as follows:       POSITIVE= underlined text  Negative = text not underlined  General:  fever, chills, sweats, generalized weakness, weight loss/gain,      loss of appetite   Eyes:    blurred vision, eye pain, loss of vision, double vision  ENT:    rhinorrhea, pharyngitis   Respiratory:   cough, sputum production, SOB, HAWK, wheezing, pleuritic pain   Cardiology:   chest pain, palpitations, orthopnea, PND, edema, syncope   Gastrointestinal:  abdominal pain , N/V, diarrhea, dysphagia, constipation, bleeding   Genitourinary:  frequency, urgency, dysuria, hematuria, incontinence   Muskuloskeletal :  arthralgia, myalgia, back pain  Hematology:  easy bruising, nose or gum bleeding, lymphadenopathy   Dermatological: rash, ulceration, pruritis, color change / jaundice  Endocrine:   hot flashes or polydipsia   Neurological:  headache, dizziness, confusion, focal weakness, paresthesia,     Speech difficulties, memory loss, gait difficulty  Psychological: Feelings of anxiety, depression, agitation    Objective:   VITALS:    Visit Vitals    /46 (BP 1 Location: Left arm, BP Patient Position: Lying right side)    Temp 98.1 °F (36.7 °C)    Resp 24    Ht 5' 1\" (1.549 m)    Wt 68 kg (150 lb)    SpO2 93%    BMI 28.34 kg/m2       PHYSICAL EXAM:    General:    Lethargic, uncooperative, Not septic and not in Resp distress, appears stated age. HEENT: Atraumatic, anicteric sclerae, pink conjunctivae     No oral ulcers, mucosa moist, throat clear, dentition fair, NO JVP  Neck:  Supple, symmetrical,  thyroid: non tender  Lungs:   GOOd air exchange  Scattered wheezing &Rhonchi. No rales. Chest wall:  No tenderness  No Accessory muscle use. Heart:   Regular  rhythm,  No  murmur   No edema  Abdomen:   Soft, non-tender. Not distended.   Bowel sounds normal  Extremities: No cyanosis. No clubbing,      Skin turgor POOR DRY, Capillary refill normal, Radial dial pulse 2+  Skin:     Not pale. Not Jaundiced  No rashes   Psych:  Good insight. Not depressed. Not anxious or agitated. Neurologic: EOMs intact. No facial asymmetry. No aphasia or slurred speech. Symmetrical strength, Sensation grossly intact. Alert and oriented X 4.     _______________________________________________________________________  Care Plan discussed with:    Comments   Patient     Family  y Family just left   RN y Er nurse   Care Manager                    Consultant:   [facility] physician   _______________________________________________________________________  Expected  Disposition:   Home with Family    HH/PT/OT/RN    SNF/LTC y   [de-identified]    ________________________________________________________________________  TOTAL TIME:  48 Minutes    Critical Care Provided     Minutes non procedure based      Comments    yes Reviewed previous records   >50% of visit spent in counseling and coordination of care  Discussion with patient and/or family and questions answered       ________________________________________________________________________  Signed: Rowena Lopez MD    Procedures: see electronic medical records for all procedures/Xrays and details which were not copied into this note but were reviewed prior to creation of Plan.     LAB DATA REVIEWED:    Recent Results (from the past 24 hour(s))   GLUCOSE, POC    Collection Time: 12/29/17  5:40 PM   Result Value Ref Range    Glucose (POC) 253 (H) 65 - 100 mg/dL    Performed by [facility]    EKG, 12 LEAD, INITIAL    Collection Time: 12/29/17  6:04 PM   Result Value Ref Range    Ventricular Rate 79 BPM    Atrial Rate 79 BPM    P-R Interval 180 ms    QRS Duration 100 ms    Q-T Interval 422 ms    QTC Calculation (Bezet) 483 ms    Calculated P Axis 35 degrees    Calculated R Axis -34 degrees    Calculated T Axis 69 degrees    Diagnosis       Normal sinus rhythm  Left axis deviation  Minimal voltage criteria for LVH, may be normal variant  Anterior infarct (cited on or before 29-DEC-2017)  When compared with ECG of 12-DEC-2017 11:24,  No significant change was found     METABOLIC PANEL, COMPREHENSIVE    Collection Time: 12/29/17  6:16 PM   Result Value Ref Range    Sodium 138 136 - 145 mmol/L    Potassium 4.3 3.5 - 5.1 mmol/L    Chloride 101 97 - 108 mmol/L    CO2 27 21 - 32 mmol/L    Anion gap 10 5 - 15 mmol/L    Glucose 243 (H) 65 - 100 mg/dL    BUN 41 (H) 6 - 20 MG/DL    Creatinine 2.32 (H) 0.55 - 1.02 MG/DL    BUN/Creatinine ratio 18 12 - 20      GFR est AA 24 (L) >60 ml/min/1.73m2    GFR est non-AA 20 (L) >60 ml/min/1.73m2    Calcium 8.6 8.5 - 10.1 MG/DL    Bilirubin, total 1.0 0.2 - 1.0 MG/DL    ALT (SGPT) 17 12 - 78 U/L    AST (SGOT) 22 15 - 37 U/L    Alk. phosphatase 166 (H) 45 - 117 U/L    Protein, total 7.9 6.4 - 8.2 g/dL    Albumin 3.1 (L) 3.5 - 5.0 g/dL    Globulin 4.8 (H) 2.0 - 4.0 g/dL    A-G Ratio 0.6 (L) 1.1 - 2.2     CBC WITH AUTOMATED DIFF    Collection Time: 12/29/17  6:16 PM   Result Value Ref Range    WBC 7.8 3.6 - 11.0 K/uL    RBC 3.95 3.80 - 5.20 M/uL    HGB 12.2 11.5 - 16.0 g/dL    HCT 37.5 35.0 - 47.0 %    MCV 94.9 80.0 - 99.0 FL    MCH 30.9 26.0 - 34.0 PG    MCHC 32.5 30.0 - 36.5 g/dL    RDW 15.9 (H) 11.5 - 14.5 %    PLATELET 247 546 - 441 K/uL    NEUTROPHILS 70 %    BAND NEUTROPHILS 21 %    LYMPHOCYTES 5 %    MONOCYTES 3 %    EOSINOPHILS 0 %    BASOPHILS 0 %    METAMYELOCYTES 1 %    ABS. NEUTROPHILS 7.1 K/UL    ABS. LYMPHOCYTES 0.4 K/UL    ABS. MONOCYTES 0.2 K/UL    ABS. EOSINOPHILS 0.0 K/UL    ABS.  BASOPHILS 0.0 K/UL    RBC COMMENTS NORMOCYTIC, NORMOCHROMIC     LACTIC ACID    Collection Time: 12/29/17  6:16 PM   Result Value Ref Range    Lactic acid 5.0 (HH) 0.4 - 2.0 MMOL/L

## 2017-12-30 NOTE — PROGRESS NOTES
Bedside shift change report given to Jere Kenney RN (oncoming nurse) by Erika Moss RN (offgoing nurse). Report included the following information SBAR.     0930 Pt's admission database is incomplete. Pt is not talking to staff. Per mpoa the Pt is normally responsive; however, she was told Pt was unresponsive by staff yesterday as well. When mpoa comes in will try to complete database. 1014 Unsure if speech therapy comes on the weekend. Paged speech to come see Pt if able today because Pt is NPO until evaluated. Per leyla Sharpe) the Pt aspir[facility]it at [facility]. [id number] on [address]. Pt opened eyes to voice and responded with a quick wave. No verbal response to questions. 1640 Pt still not responding to questions verbally. Performed incontinence care and turned Pt at which point Pt yelled out, \"stop it\" and \"don't,\" repeatedly. When asked if she was in pain she would not respond. 1805 Pt becoming increasing agitated and wanting to get out of the bed. The Pt is also becoming combative. Dr. Edwin Anguiano has been paged. 1810 Dr. Edwin Anguiano ordered ativan 0.5mg IV push one time and agreed with sitter. Bedside shift change report given to Davidson Landry (oncoming nurse) by Jere Kenney RN (offgoing nurse). Report included the following information SBAR. SHIFT SUMMARY:            [address] [facility] NURSING NOTE   Admission Date 12/29/2017   Admission Diagnosis Acute respiratory failure ([address]   Consults None      Cardiac Monitoring [x] Yes [] No      Purposeful Hourly Rounding [x] Yes    Billie Score Total Score: 4   Billie score 3 or > [x] Bed Alarm [] Avasys [x] 1:1 sitter [] Patient refused (Place signed refusal form in chart)   Lewis Score Leiws Score: 15   Lewis score 14 or < [] PMT consult [] Wound Care consult    []  Specialty bed  [] Nutrition consult      Influenza Vaccine             Oxygen needs?  [] Room air Oxygen @  []1L    [x]2L    []3L   []4L    []5L   []6L     Use home O2? [] Yes [x] No  Perform O2 challenge test using  smartphrase (.Homeoxygen)      Last bowel movement Last Bowel Movement Date: 12/29/17      Urinary Catheter             LDAs               Peripheral IV 12/29/17 Right Antecubital (Active)   Site Assessment Clean, dry, & intact 12/30/2017  8:00 AM   Phlebitis Assessment 0 12/30/2017  8:00 AM   Infiltration Assessment 0 12/30/2017  8:00 AM   Dressing Status Clean, dry, & intact 12/30/2017  8:00 AM   Dressing Type Tape;Transparent 12/30/2017  8:00 AM   Hub Color/Line Status Blue 12/30/2017  8:00 AM          External Female Catheter 12/30/17 (Active)   Site Assessment Clean, dry, & intact 12/30/2017  4:40 PM   Repositioned Yes 12/30/2017  4:40 PM   Perineal Care Yes 12/30/2017  4:40 PM   Wick Changed Yes 12/30/2017  4:40 PM   Suction Canister/Tubing Changed No 12/30/2017  4:40 PM                   Readmission Risk Assessment Tool Score High Risk            43       Total Score        3 Has Seen PCP in Last 6 Months (Yes=3, No=0)    2 . Living with Significant Other. Assisted Living. LTAC. SNF. or   Rehab    5 Pt.  Coverage (Medicare=5 , Medicaid, or Self-Pay=4)    33 Charlson Comorbidity Score (Age + Comorbid Conditions)        Criteria that do not apply:    Patient Length of Stay (>5 days = 3)    IP Visits Last 12 Months (1-3=4, 4=9, >4=11)       Expected Length of Stay - - -   Actual Length of Stay 1

## 2017-12-30 NOTE — PROGRESS NOTES
Pharmacy Clarification of the Prior to Admission Medication Regimen Retrospective to the Admission Medication Reconciliation    The patient was not interviewed regarding clarification of the prior to admission medication regimen. [facility], to [facility], with a current med list. Pharmacy called [facility], [phone], and spoke with Shon Prince LPN, who was able to verify the patient's last administered doses. Information Obtained From: Transfer Papers    Recommendations/Findings: The following amendments were made to the patient's active medication list on file at [facility]:     1) Additions:    acetaminophen (TYLENOL) 500 mg tablet   calcium carbonate (OYSTER SHELL CALCIUM) 500 mg calcium (1,250 mg) tablet   glucagon (GLUCAGEN) 1 mg injection   insulin lispro (HUMALOG) 100 unit/mL injection   magnesium hydroxide (KING MILK OF MAGNESIA) 400 mg/5 mL suspension    2) Removals:    Cefdinir (OMNICEF) 300 mg capsule   Insulin regular (HUMULIN R) 100 unit/mL injection   Lidocaine-emollient cmb no. 102 5% PaCr   Potassium chloride SR (KLOR-CON 10) 10 mEq tablet    3) Changes:   clopidogrel (PLAVIX) 75 mg tablet (Old regimen: (dose) none /New regimen: (dose) 75 mg)   insulin NPH (HUMULIN N) 100 unit/mL injection (Old regimen: (dose/frequency) None/None /New regimen: (dose/frequency) 16 units BID)      4) Pertinent Pharmacy Findings:   Updated patients preferred outpatient pharmacy to: Pharmacy did not update the outpatient pharmacy due to patient living at a SNF. PTA medication list was corrected to the following:     Prior to Admission Medications   Prescriptions Last Dose Informant Patient Reported? Taking?   acetaminophen (TYLENOL) 500 mg tablet Not Taking at Unknown time Transfer Papers Yes No   Sig: Take 1,000 mg by mouth every four (4) hours as needed for Pain.    allopurinol (ZYLOPRIM) 100 mg tablet 12/29/2017 at Unknown time Transfer Papers No Yes   Sig: Take 1 Tab by mouth daily. amLODIPine (NORVASC) 5 mg tablet 2017 at Unknown time Transfer Papers Yes Yes   Sig: Take 5 mg by mouth daily. atorvastatin (LIPITOR) 40 mg tablet 2017 at Unknown time Transfer Papers Yes Yes   Sig: Take 40 mg by mouth daily. calcium carbonate (OYSTER SHELL CALCIUM) 500 mg calcium (1,250 mg) tablet 2017 at Unknown time Transfer Papers Yes Yes   Sig: Take 1 Tab by mouth two (2) times a day. clopidogrel (PLAVIX) 75 mg tablet 2017 at Unknown time Transfer Papers Yes Yes   Sig: Take 75 mg by mouth daily. furosemide (LASIX) 80 mg tablet 2017 at Unknown time Transfer Papers Yes Yes   Sig: Take 160 mg by mouth two (2) times a day. glucagon (GLUCAGEN) 1 mg injection Not Taking at Unknown time Transfer Papers Yes No   Si mg by IntraVENous route as needed ('Hypoglycemia'). 'Give if blood sugar <60 and unconscious and call 911. If no response in 15 minutes repeat.'   insulin NPH (HUMULIN N) 100 unit/mL injection 2017 at Unknown time Transfer Papers Yes Yes   Si Units by SubCUTAneous route two (2) times a day. 'Hold for Blood Sugars below 120. Notify physician of Blood sugar <60 and >350.'   insulin lispro (HUMALOG) 100 unit/mL injection 2017 at Unknown time Transfer Papers Yes Yes   Si Units by SubCUTAneous route three (3) times daily. 'Hold if Blood sugar is below 120 - Notify Physician of Blood Sugar <60 and >350.'  'If above 350 give a total of 7 units.'   levothyroxine (LEVOXYL) 88 mcg tablet 2017 at Unknown time Transfer Papers Yes Yes   Sig: Take 88 mcg by mouth daily (before breakfast). magnesium hydroxide (KING MILK OF MAGNESIA) 400 mg/5 mL suspension Not Taking at Unknown time Transfer Papers Yes No   Sig: Take 30 mL by mouth daily as needed for Constipation. metoprolol tartrate 75 mg tab 2017 at Unknown time Transfer Papers Yes Yes   Sig: Take 75 mg by mouth two (2) times a day. ranitidine (ZANTAC) 150 mg tablet 12/29/2017 at Unknown time Transfer Papers Yes Yes   Sig: Take 150 mg by mouth two (2) times a day.       Facility-Administered Medications: None          Thank you,  Wilbur Redding CPhT  Medication History Pharmacy Technician

## 2017-12-30 NOTE — PROGRESS NOTES
TRANSFER - IN REPORT:    Verbal report received from [place](name) on Ligia Richards  being received from ed(unit) for routine progression of care      Report consisted of patients Situation, Background, Assessment and   Recommendations(SBAR). Information from the following report(s) Kardex, ED Summary, Intake/Output, MAR and Recent Results was reviewed with the receiving nurse. Opportunity for questions and clarification was provided. Assessment completed upon patients arrival to unit and care assumed.

## 2017-12-30 NOTE — PROGRESS NOTES
ADULT PROTOCOL: JET AEROSOL ASSESSMENT    Patient  Karissa Rodriguez     80 y.o.   female     12/30/2017  12:43 PM    Breath Sounds Pre Procedure: Right Breath Sounds: Scattered wheezing                               Left Breath Sounds: Scattered wheezing                                       Breathing pattern: Pre procedure Breathing Pattern: Regular          Post procedure Breathing Pattern: Regular    Heart Rate: Pre procedure Pulse: 83           Post procedure Pulse: 84    Resp Rate: Pre procedure Respirations: 18           Post procedure Respirations: 18  Oxygen:2L/NC      Changed: NO    SpO2: Pre procedure SpO2: 94 %                 Post procedure SpO2: 94 %      Nebulizer Therapy: Current medications Aerosolized Medications: DuoNeb      Changed:Yes BID    Problem List:   Patient Active Problem List   Diagnosis Code    Renal failure, acute (HCC) N17.9    Dehydration E86.0    DM (diabetes mellitus), type 2, uncontrolled, with renal complications (HCC) J48.63, E11.65    HTN (hypertension) I10    Unspecified hypothyroidism E03.9    CAD (coronary artery disease) I25.10    Gout M10.9    CKD (chronic kidney disease) stage 3, GFR 30-59 ml/min N18.3    Other and unspecified hyperlipidemia E78.5    Rhabdomyolysis M62.82    Weakness of both legs R29.898    Debility R53.81    At risk for falls Z91.81    Dehydration, moderate E86.0    Acute respiratory failure (HCC) J96.00    Aspiration pneumonia (HCC) J69.0    Lactic acidosis E87.2    Dementia F03.90       Respiratory Therapist: Lauri Posada, RT

## 2017-12-31 LAB
ANION GAP SERPL CALC-SCNC: 9 MMOL/L (ref 5–15)
BUN SERPL-MCNC: 45 MG/DL (ref 6–20)
BUN/CREAT SERPL: 21 (ref 12–20)
CALCIUM SERPL-MCNC: 8.2 MG/DL (ref 8.5–10.1)
CHLORIDE SERPL-SCNC: 108 MMOL/L (ref 97–108)
CO2 SERPL-SCNC: 27 MMOL/L (ref 21–32)
CREAT SERPL-MCNC: 2.1 MG/DL (ref 0.55–1.02)
ERYTHROCYTE [DISTWIDTH] IN BLOOD BY AUTOMATED COUNT: 16.4 % (ref 11.5–14.5)
GLUCOSE BLD STRIP.AUTO-MCNC: 158 MG/DL (ref 65–100)
GLUCOSE BLD STRIP.AUTO-MCNC: 169 MG/DL (ref 65–100)
GLUCOSE BLD STRIP.AUTO-MCNC: 197 MG/DL (ref 65–100)
GLUCOSE SERPL-MCNC: 142 MG/DL (ref 65–100)
HCT VFR BLD AUTO: 33.8 % (ref 35–47)
HGB BLD-MCNC: 10.8 G/DL (ref 11.5–16)
LACTATE SERPL-SCNC: 1 MMOL/L (ref 0.4–2)
MCH RBC QN AUTO: 30.8 PG (ref 26–34)
MCHC RBC AUTO-ENTMCNC: 32 G/DL (ref 30–36.5)
MCV RBC AUTO: 96.3 FL (ref 80–99)
PLATELET # BLD AUTO: 262 K/UL (ref 150–400)
POTASSIUM SERPL-SCNC: 4 MMOL/L (ref 3.5–5.1)
RBC # BLD AUTO: 3.51 M/UL (ref 3.8–5.2)
SERVICE CMNT-IMP: ABNORMAL
SODIUM SERPL-SCNC: 144 MMOL/L (ref 136–145)
WBC # BLD AUTO: 15.8 K/UL (ref 3.6–11)

## 2017-12-31 PROCEDURE — 77010033678 HC OXYGEN DAILY

## 2017-12-31 PROCEDURE — 74011000250 HC RX REV CODE- 250: Performed by: INTERNAL MEDICINE

## 2017-12-31 PROCEDURE — 74011250636 HC RX REV CODE- 250/636: Performed by: INTERNAL MEDICINE

## 2017-12-31 PROCEDURE — 74011000258 HC RX REV CODE- 258: Performed by: INTERNAL MEDICINE

## 2017-12-31 PROCEDURE — 65660000000 HC RM CCU STEPDOWN

## 2017-12-31 PROCEDURE — 74011636637 HC RX REV CODE- 636/637: Performed by: INTERNAL MEDICINE

## 2017-12-31 PROCEDURE — 83605 ASSAY OF LACTIC ACID: CPT | Performed by: INTERNAL MEDICINE

## 2017-12-31 PROCEDURE — 74011250636 HC RX REV CODE- 250/636: Performed by: EMERGENCY MEDICINE

## 2017-12-31 PROCEDURE — 85027 COMPLETE CBC AUTOMATED: CPT | Performed by: INTERNAL MEDICINE

## 2017-12-31 PROCEDURE — 36415 COLL VENOUS BLD VENIPUNCTURE: CPT | Performed by: INTERNAL MEDICINE

## 2017-12-31 PROCEDURE — 82962 GLUCOSE BLOOD TEST: CPT

## 2017-12-31 PROCEDURE — 77030038269 HC DRN EXT URIN PURWCK BARD -A

## 2017-12-31 PROCEDURE — 80048 BASIC METABOLIC PNL TOTAL CA: CPT | Performed by: INTERNAL MEDICINE

## 2017-12-31 RX ORDER — LORAZEPAM 2 MG/ML
0.5 INJECTION INTRAMUSCULAR ONCE
Status: COMPLETED | OUTPATIENT
Start: 2017-12-31 | End: 2017-12-31

## 2017-12-31 RX ORDER — DEXTROSE MONOHYDRATE AND SODIUM CHLORIDE 5; .9 G/100ML; G/100ML
50 INJECTION, SOLUTION INTRAVENOUS CONTINUOUS
Status: DISCONTINUED | OUTPATIENT
Start: 2017-12-31 | End: 2018-01-01

## 2017-12-31 RX ORDER — LORAZEPAM 2 MG/ML
0.5 INJECTION INTRAMUSCULAR
Status: DISCONTINUED | OUTPATIENT
Start: 2017-12-31 | End: 2018-01-03

## 2017-12-31 RX ADMIN — FUROSEMIDE 40 MG: 10 INJECTION, SOLUTION INTRAMUSCULAR; INTRAVENOUS at 10:15

## 2017-12-31 RX ADMIN — METOPROLOL TARTRATE 5 MG: 5 INJECTION, SOLUTION INTRAVENOUS at 12:58

## 2017-12-31 RX ADMIN — PIPERACILLIN SODIUM AND TAZOBACTAM SODIUM 3.38 G: .375; 3 INJECTION, POWDER, LYOPHILIZED, FOR SOLUTION INTRAVENOUS at 12:39

## 2017-12-31 RX ADMIN — PIPERACILLIN SODIUM AND TAZOBACTAM SODIUM 3.38 G: .375; 3 INJECTION, POWDER, LYOPHILIZED, FOR SOLUTION INTRAVENOUS at 22:12

## 2017-12-31 RX ADMIN — INSULIN LISPRO 2 UNITS: 100 INJECTION, SOLUTION INTRAVENOUS; SUBCUTANEOUS at 17:27

## 2017-12-31 RX ADMIN — METOPROLOL TARTRATE 5 MG: 5 INJECTION, SOLUTION INTRAVENOUS at 23:24

## 2017-12-31 RX ADMIN — INSULIN LISPRO 2 UNITS: 100 INJECTION, SOLUTION INTRAVENOUS; SUBCUTANEOUS at 07:30

## 2017-12-31 RX ADMIN — HEPARIN SODIUM 5000 UNITS: 5000 INJECTION, SOLUTION INTRAVENOUS; SUBCUTANEOUS at 04:59

## 2017-12-31 RX ADMIN — METOPROLOL TARTRATE 5 MG: 5 INJECTION, SOLUTION INTRAVENOUS at 05:02

## 2017-12-31 RX ADMIN — METOPROLOL TARTRATE 5 MG: 5 INJECTION, SOLUTION INTRAVENOUS at 17:27

## 2017-12-31 RX ADMIN — HEPARIN SODIUM 5000 UNITS: 5000 INJECTION, SOLUTION INTRAVENOUS; SUBCUTANEOUS at 20:54

## 2017-12-31 RX ADMIN — Medication 10 ML: at 20:54

## 2017-12-31 RX ADMIN — DEXTROSE MONOHYDRATE AND SODIUM CHLORIDE 50 ML/HR: 5; .9 INJECTION, SOLUTION INTRAVENOUS at 07:16

## 2017-12-31 RX ADMIN — LORAZEPAM 0.5 MG: 2 INJECTION INTRAMUSCULAR; INTRAVENOUS at 10:16

## 2017-12-31 RX ADMIN — HEPARIN SODIUM 5000 UNITS: 5000 INJECTION, SOLUTION INTRAVENOUS; SUBCUTANEOUS at 12:40

## 2017-12-31 RX ADMIN — Medication 10 ML: at 05:02

## 2017-12-31 NOTE — PROGRESS NOTES
Bedside shift change report given to Layla Del Cid RN (oncoming nurse) by Sirena Walker RN (offgoing nurse). Report included the following information SBAR.     0951 Pt repeatedly trying to get up out of bed and stating she is, \"going home today. \"  Dr. Boone Handy paged. Pt is requiring a sitter and to see if we can give another dose of ativan. Bedside shift change report given to Ashlyn Jewell (oncoming nurse) by Layla Del Cid RN (offgoing nurse). Report included the following information SBAR. SHIFT SUMMARY:            [address]UR[address]   Admission Date 12/29/2017   Admission Diagnosis Acute respiratory failure [address] [address]   Consults None      Cardiac Monitoring [x] Yes [] No      Purposeful Hourly Rounding [x] Yes    Billie Score Total Score: 4   Billie score 3 or > [x] Bed Alarm [] Avasys [] 1:1 sitter [] Patient refused (Place signed refusal form in chart)   Lewis Score Lewis Score: 16   Lewis score 14 or < [] PMT consult [] Wound Care consult    []  Specialty bed  [] Nutrition consult      Influenza Vaccine             Oxygen needs? [x] Room air Oxygen @  []1L    []2L    []3L   []4L    []5L   []6L     Use home O2? [] Yes [x] No  Perform O2 challenge test using  smartphrase (.Homeoxygen)      Last bowel movement Last Bowel Movement Date: 12/29/17      Urinary Catheter       External Female Catheter 12/30/17-Urine Output (mL): 350 ml     LDAs               Peripheral IV 12/29/17 Right Antecubital (Active)   Site Assessment Clean, dry, & intact 12/31/2017  4:00 PM   Phlebitis Assessment 0 12/31/2017  4:00 PM   Infiltration Assessment 0 12/31/2017  4:00 PM   Dressing Status Clean, dry, & intact 12/31/2017  4:00 PM   Dressing Type Transparent;Tape 12/31/2017  4:00 PM   Hub Color/Line Status Blue; Infusing 12/31/2017  4:00 PM          External Female Catheter 12/30/17 (Active)   Site Assessment Clean, dry, & intact 12/31/2017  7:18 AM   Repositioned No 12/31/2017  7:18 AM   Perineal Care No 12/31/2017  7:18 AM   Wick Changed No 12/31/2017  7:18 AM   Suction Canister/Tubing Changed No 12/31/2017  7:18 AM   Urine Output (mL) 350 ml 12/31/2017  7:18 AM                   Readmission Risk Assessment Tool Score High Risk            41       Total Score        3 Has Seen PCP in Last 6 Months (Yes=3, No=0)    5 Pt. Coverage (Medicare=5 , Medicaid, or Self-Pay=4)    33 Charlson Comorbidity Score (Age + Comorbid Conditions)        Criteria that do not apply:    . Living with Significant Other. Assisted Living. LTAC. SNF.  or   Rehab    Patient Length of Stay (>5 days = 3)    IP Visits Last 12 Months (1-3=4, 4=9, >4=11)       Expected Length of Stay - - -   Actual Length of Stay 2

## 2017-12-31 NOTE — PROGRESS NOTES
[address] [place] [address]  [phone]      Medical Progress Note      NAME: Zahraa Del Cid   :  10/31/1924  MRM:  [id number]    Date/Time: 2017  6:35 AM         Subjective:     Confused and agitated last PM and given IV ativan with improvement. Afebrile. O2 sat's in high 90's on O2 at 2 LPM. Lactic acid down to 1.0. Creat 2.10. WBC 15.8. Not seen by SLT yesterday and remains NPO. BS's better and currently on SSI. 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    Gout     Hyperlipidemia     Hypertension     Hypothyroidism     Other ill-defined conditions(799.89)     sleep apnea    Other ill-defined conditions(799.89)     Cardiomyopathy    Syncope     Low blood sugar       ROS:  Patient was not able to provide review of systems due to dementia         Objective:       Vitals:        Last 24hrs VS reviewed since prior progress note. Most recent are:    Visit Vitals    /58    Pulse 72    Temp 97.9 °F (36.6 °C)    Resp 18    Ht 5' 1\" (1.549 m)    Wt 133 lb 8 oz (60.6 kg)    SpO2 98%    BMI 25.22 kg/m2     SpO2 Readings from Last 6 Encounters:   17 98%   17 95%   17 98%   16 91%   16 96%   09/02/15 98%    O2 Flow Rate (L/min): 2 l/min       Intake/Output Summary (Last 24 hours) at 17 1334  Last data filed at 17 9025   Gross per 24 hour   Intake          1468.33 ml   Output             2600 ml   Net         -1131.67 ml        Telemetry reviewed:   normal sinus rhythm  Tubes:   N/A  X-Ray:  Admission chest xray - New right basilar airspace disease may represent pneumonia or aspiration  Procedures:   N/A    Exam:     General   Obese WF, in NAD  Neck   Supple without nodes or mass.  No thyromegaly or bruit  Respiratory   Rales right base without wheezing  Cardiology  Regular Rate and Rythmn  - no murmurs, rubs or gallops  Abdominal  Soft, non-tender, non-distended, positive bowel sounds, no hepatosplenomegaly  Extremities  No clubbing, cyanosis, or edema. Pulses intact. Skin  Normal skin turgor.   No rashes or skin ulcers noted  Neurological  No focal neurological deficits noted  Psychological  Oriented to name  Exam otherwise negative     Lab Data Reviewed: (see below)    Medications Reviewed: (see below)    ______________________________________________________________________    Medications:     Current Facility-Administered Medications   Medication Dose Route Frequency    glucose chewable tablet 16 g  4 Tab Oral PRN    dextrose (D50W) injection syrg 12.5-25 g  12.5-25 g IntraVENous PRN    glucagon (GLUCAGEN) injection 1 mg  1 mg IntraMUSCular PRN    insulin lispro (HUMALOG) injection   SubCUTAneous AC&HS    piperacillin-tazobactam (ZOSYN) 3.375 g in  ml premix/cmpd  3.375 g IntraVENous Q12H    albuterol-ipratropium (DUO-NEB) 2.5 MG-0.5 MG/3 ML  3 mL Nebulization BID RT    sodium chloride (NS) flush 5-10 mL  5-10 mL IntraVENous Q8H    acetaminophen (TYLENOL) tablet 650 mg  650 mg Oral Q6H PRN    heparin (porcine) injection 5,000 Units  5,000 Units SubCUTAneous Q8H    0.9% sodium chloride infusion  50 mL/hr IntraVENous CONTINUOUS    allopurinol (ZYLOPRIM) tablet 100 mg  100 mg Oral DAILY    amLODIPine (NORVASC) tablet 5 mg  5 mg Oral DAILY    atorvastatin (LIPITOR) tablet 40 mg  40 mg Oral DAILY    clopidogrel (PLAVIX) tablet 75 mg  75 mg Oral DAILY    levothyroxine (SYNTHROID) tablet 88 mcg  88 mcg Oral ACB    famotidine (PEPCID) tablet 20 mg  20 mg Oral DAILY    sodium chloride (NS) flush 5-10 mL  5-10 mL IntraVENous PRN    metoprolol (LOPRESSOR) injection 5 mg  5 mg IntraVENous Q4H    furosemide (LASIX) injection 40 mg  40 mg IntraVENous DAILY            Lab Review:     Recent Labs      12/31/17   020 12/30/17   0142  12/29/17   1816   WBC  15.8*  12.6*  7.8   HGB  10.8*  10.9*  12.2   HCT  33.8*  34.1*  37.5   PLT  262  270  285     Recent Labs      12/31/17   0209  12/29/17   2040  12/29/17   1816   NA  144  140  138   K  4.0  4.1  4.3   CL  108  103  101   CO2  27  27  27   GLU  142*  233*  243*   BUN  45*  42*  41*   CREA  2.10*  2.29*  2.32*   CA  8.2*  8.1*  8.6   MG   --   2.4   --    ALB   --    --   3.1*   TBILI   --    --   1.0   SGOT   --    --   22   ALT   --    --   17     Lab Results   Component Value Date/Time    Glucose (POC) 105 12/30/2017 08:20 PM    Glucose (POC) 179 12/30/2017 11:55 AM    Glucose (POC) 263 12/30/2017 07:33 AM    Glucose (POC) 253 12/29/2017 05:40 PM    Glucose (POC) 244 07/31/2017 04:55 PM     No results for input(s): PH, PCO2, PO2, HCO3, FIO2 in the last 72 hours. No results for input(s): INR in the last 72 hours. No lab exists for component: INREXT, INREXT         Assessment:     Principal Problem:    Acute respiratory failure ([email or web address] (12/29/2017)    Active Problems:    DM (diabetes mellitus), type 2, uncontrolled, with renal complications ([facility] [email or web address] (9/77/2974)      CKD (chronic kidney disease) stage 3, GFR 30-59 ml/min (8/23/2010)      Aspiration pneumonia ([facility] [[email or web address] (12/30/2017)      Lactic acidosis (12/30/2017)      Dementia (12/30/2017)           Plan:     Risk of deterioration: medium             1. Continue slow IV hydration - will add D5 as she is NPO  2. Continue zosyn  3. Jet nebs and O2 as needed  4. Await cultures  5. NPO until SLT evaluation   6. Continue SSI  7.  DNR noted     Care Plan discussed with: Nursing    Discussed:  Care Plan    Prophylaxis:  Hep SQ    Disposition:  Home w/Family           ___________________________________________________    Attending Physician: Shashi Orozco MD

## 2017-12-31 NOTE — PROGRESS NOTES
Bedside shift change report given to Alejandra Colon (oncoming nurse) by Viv Wolf (offgoing nurse). Report included the following information SBAR, Kardex, Intake/Output, MAR and Recent Results. SHIFT SUMMARY:            [facility] NURSING NOTE   Admission Date 12/29/2017   Admission Diagnosis Acute respiratory failure ([address]   Consults None      Cardiac Monitoring [x] Yes [] No      Purposeful Hourly Rounding [x] Yes    Billie Score Total Score: 4   Billie score 3 or > [x] Bed Alarm [] Avasys [x] 1:1 sitter [] Patient refused (Place signed refusal form in chart)   Lewis Score Lewis Score: 15   Lewis score 14 or < [] PMT consult [] Wound Care consult    []  Specialty bed  [] Nutrition consult      Influenza Vaccine             Oxygen needs? [] Room air Oxygen @  []1L    [x]2L    []3L   []4L    []5L   []6L     Use home O2? [x] Yes [] No  Perform O2 challenge test using  smartphrase (.Homeoxygen)      Last bowel movement Last Bowel Movement Date: 12/29/17      Urinary Catheter       External Female Catheter 12/30/17-Urine Output (mL): 400 ml     LDAs               Peripheral IV 12/29/17 Right Antecubital (Active)   Site Assessment Clean, dry, & intact 12/30/2017  7:39 PM   Phlebitis Assessment 0 12/30/2017  7:39 PM   Infiltration Assessment 0 12/30/2017  7:39 PM   Dressing Status Clean, dry, & intact 12/30/2017  7:39 PM   Dressing Type Transparent 12/30/2017  7:39 PM   Hub Color/Line Status Blue; Infusing 12/30/2017  7:39 PM          External Female Catheter 12/30/17 (Active)   Site Assessment Clean, dry, & intact 12/30/2017  7:39 PM   Repositioned Yes 12/30/2017  7:39 PM   Perineal Care Yes 12/30/2017  7:39 PM   Wick Changed No 12/30/2017  7:39 PM   Suction Canister/Tubing Changed No 12/30/2017  7:39 PM   Urine Output (mL) 400 ml 12/30/2017  7:39 PM                   Readmission Risk Assessment Tool Score High Risk            43       Total Score        3 Has Seen PCP in Last 6 Months (Yes=3, No=0)    2 .  Living with Significant Other. Assisted Living. LTAC. SNF. or   Rehab    5 Pt.  Coverage (Medicare=5 , Medicaid, or Self-Pay=4)    33 Charlson Comorbidity Score (Age + Comorbid Conditions)        Criteria that do not apply:    Patient Length of Stay (>5 days = 3)    IP Visits Last 12 Months (1-3=4, 4=9, >4=11)       Expected Length of Stay - - -   Actual Length of Stay 1

## 2017-12-31 NOTE — PROGRESS NOTES
Problem: Falls - Risk of  Goal: *Absence of Falls  Document Billie Fall Risk and appropriate interventions in the flowsheet.    Outcome: Progressing Towards Goal  Fall Risk Interventions:  Mobility Interventions: Bed/chair exit alarm, Communicate number of staff needed for ambulation/transfer, Mechanical lift, OT consult for ADLs, Patient to call before getting OOB, PT Consult for mobility concerns    Mentation Interventions: Adequate sleep, hydration, pain control, Bed/chair exit alarm, Door open when patient unattended, Evaluate medications/consider consulting pharmacy, Eyeglasses and hearing aids, Familiar objects from home, Family/sitter at bedside, Increase mobility, More frequent rounding, Reorient patient, Room close to nurse's station    Medication Interventions: Bed/chair exit alarm, Evaluate medications/consider consulting pharmacy, Patient to call before getting OOB, Teach patient to arise slowly    Elimination Interventions: Bed/chair exit alarm, Call light in reach, Elevated toilet seat, Patient to call for help with toileting needs, Toilet paper/wipes in reach, Toileting schedule/hourly rounds

## 2018-01-01 ENCOUNTER — APPOINTMENT (OUTPATIENT)
Dept: GENERAL RADIOLOGY | Age: 83
DRG: 177 | End: 2018-01-01
Attending: INTERNAL MEDICINE
Payer: MEDICARE

## 2018-01-01 LAB
BACTERIA SPEC CULT: ABNORMAL
CC UR VC: ABNORMAL
ERYTHROCYTE [DISTWIDTH] IN BLOOD BY AUTOMATED COUNT: 16.4 % (ref 11.5–14.5)
GLUCOSE BLD STRIP.AUTO-MCNC: 146 MG/DL (ref 65–100)
GLUCOSE BLD STRIP.AUTO-MCNC: 199 MG/DL (ref 65–100)
GLUCOSE BLD STRIP.AUTO-MCNC: 236 MG/DL (ref 65–100)
GLUCOSE BLD STRIP.AUTO-MCNC: 258 MG/DL (ref 65–100)
HCT VFR BLD AUTO: 31.8 % (ref 35–47)
HGB BLD-MCNC: 10 G/DL (ref 11.5–16)
LACTATE SERPL-SCNC: 1.4 MMOL/L (ref 0.4–2)
MCH RBC QN AUTO: 30.7 PG (ref 26–34)
MCHC RBC AUTO-ENTMCNC: 31.4 G/DL (ref 30–36.5)
MCV RBC AUTO: 97.5 FL (ref 80–99)
PLATELET # BLD AUTO: 275 K/UL (ref 150–400)
RBC # BLD AUTO: 3.26 M/UL (ref 3.8–5.2)
SERVICE CMNT-IMP: ABNORMAL
WBC # BLD AUTO: 13.6 K/UL (ref 3.6–11)

## 2018-01-01 PROCEDURE — 82962 GLUCOSE BLOOD TEST: CPT

## 2018-01-01 PROCEDURE — 92610 EVALUATE SWALLOWING FUNCTION: CPT

## 2018-01-01 PROCEDURE — 36415 COLL VENOUS BLD VENIPUNCTURE: CPT | Performed by: INTERNAL MEDICINE

## 2018-01-01 PROCEDURE — 74011000250 HC RX REV CODE- 250: Performed by: INTERNAL MEDICINE

## 2018-01-01 PROCEDURE — 74011000258 HC RX REV CODE- 258: Performed by: INTERNAL MEDICINE

## 2018-01-01 PROCEDURE — 74011250636 HC RX REV CODE- 250/636: Performed by: INTERNAL MEDICINE

## 2018-01-01 PROCEDURE — 85027 COMPLETE CBC AUTOMATED: CPT | Performed by: INTERNAL MEDICINE

## 2018-01-01 PROCEDURE — 71045 X-RAY EXAM CHEST 1 VIEW: CPT

## 2018-01-01 PROCEDURE — 74011636637 HC RX REV CODE- 636/637: Performed by: INTERNAL MEDICINE

## 2018-01-01 PROCEDURE — 83605 ASSAY OF LACTIC ACID: CPT | Performed by: INTERNAL MEDICINE

## 2018-01-01 PROCEDURE — 74011250636 HC RX REV CODE- 250/636: Performed by: EMERGENCY MEDICINE

## 2018-01-01 PROCEDURE — 74011250637 HC RX REV CODE- 250/637: Performed by: INTERNAL MEDICINE

## 2018-01-01 PROCEDURE — 65660000000 HC RM CCU STEPDOWN

## 2018-01-01 RX ORDER — IPRATROPIUM BROMIDE AND ALBUTEROL SULFATE 2.5; .5 MG/3ML; MG/3ML
3 SOLUTION RESPIRATORY (INHALATION)
Status: DISCONTINUED | OUTPATIENT
Start: 2018-01-01 | End: 2018-01-05 | Stop reason: ALTCHOICE

## 2018-01-01 RX ADMIN — INSULIN LISPRO 5 UNITS: 100 INJECTION, SOLUTION INTRAVENOUS; SUBCUTANEOUS at 14:02

## 2018-01-01 RX ADMIN — METOPROLOL TARTRATE 5 MG: 5 INJECTION, SOLUTION INTRAVENOUS at 03:37

## 2018-01-01 RX ADMIN — METOPROLOL TARTRATE 5 MG: 5 INJECTION, SOLUTION INTRAVENOUS at 23:26

## 2018-01-01 RX ADMIN — FAMOTIDINE 20 MG: 20 TABLET, FILM COATED ORAL at 11:30

## 2018-01-01 RX ADMIN — PIPERACILLIN SODIUM AND TAZOBACTAM SODIUM 3.38 G: .375; 3 INJECTION, POWDER, LYOPHILIZED, FOR SOLUTION INTRAVENOUS at 10:40

## 2018-01-01 RX ADMIN — METOPROLOL TARTRATE 5 MG: 5 INJECTION, SOLUTION INTRAVENOUS at 10:15

## 2018-01-01 RX ADMIN — DEXTROSE MONOHYDRATE AND SODIUM CHLORIDE 50 ML/HR: 5; .9 INJECTION, SOLUTION INTRAVENOUS at 03:38

## 2018-01-01 RX ADMIN — CLOPIDOGREL BISULFATE 75 MG: 75 TABLET ORAL at 11:30

## 2018-01-01 RX ADMIN — METOPROLOL TARTRATE 5 MG: 5 INJECTION, SOLUTION INTRAVENOUS at 18:06

## 2018-01-01 RX ADMIN — PIPERACILLIN SODIUM AND TAZOBACTAM SODIUM 3.38 G: .375; 3 INJECTION, POWDER, LYOPHILIZED, FOR SOLUTION INTRAVENOUS at 20:57

## 2018-01-01 RX ADMIN — LEVOTHYROXINE SODIUM 88 MCG: 88 TABLET ORAL at 10:16

## 2018-01-01 RX ADMIN — HEPARIN SODIUM 5000 UNITS: 5000 INJECTION, SOLUTION INTRAVENOUS; SUBCUTANEOUS at 03:38

## 2018-01-01 RX ADMIN — HEPARIN SODIUM 5000 UNITS: 5000 INJECTION, SOLUTION INTRAVENOUS; SUBCUTANEOUS at 20:57

## 2018-01-01 RX ADMIN — ALLOPURINOL 100 MG: 100 TABLET ORAL at 11:30

## 2018-01-01 RX ADMIN — ATORVASTATIN CALCIUM 40 MG: 40 TABLET, FILM COATED ORAL at 11:30

## 2018-01-01 RX ADMIN — Medication 10 ML: at 20:58

## 2018-01-01 RX ADMIN — METOPROLOL TARTRATE 5 MG: 5 INJECTION, SOLUTION INTRAVENOUS at 20:57

## 2018-01-01 RX ADMIN — INSULIN LISPRO 3 UNITS: 100 INJECTION, SOLUTION INTRAVENOUS; SUBCUTANEOUS at 10:13

## 2018-01-01 RX ADMIN — Medication 10 ML: at 03:38

## 2018-01-01 RX ADMIN — AMLODIPINE BESYLATE 5 MG: 5 TABLET ORAL at 11:30

## 2018-01-01 RX ADMIN — HEPARIN SODIUM 5000 UNITS: 5000 INJECTION, SOLUTION INTRAVENOUS; SUBCUTANEOUS at 14:03

## 2018-01-01 RX ADMIN — ACETAMINOPHEN 650 MG: 325 TABLET ORAL at 16:54

## 2018-01-01 RX ADMIN — METOPROLOL TARTRATE 5 MG: 5 INJECTION, SOLUTION INTRAVENOUS at 14:04

## 2018-01-01 NOTE — PROGRESS NOTES
ADULT PROTOCOL: JET AEROSOL  REASSESSMENT    Patient  Neda Fletcher     80 y.o.   female     1/1/2018  11:11 AM    Breath Sounds Clear  Breathing pattern: Pre procedure Breathing Pattern: Regular          Heart Rate: Pre procedure Pulse: 72           Resp Rate: Pre procedure Respirations: 18          Cough: Pre procedure Cough: Non-productive                 Oxygen: O2 Device: Room air        Changed: NO    SpO2: Pre procedure SpO2: 95 %                 Nebulizer Therapy: Current medications Aerosolized Medications: DuoNeb Bid      Changed: {YES To Q6prn          Pt refuses to take nebulizer tx  Smoking History: Never    Problem List:   Patient Active Problem List   Diagnosis Code    Renal failure, acute ([facility] N17.9    Dehydration E86.0    DM (diabetes mellitus), type 2, uncontrolled, with renal complications (HCC) F60.54, E11.65    HTN (hypertension) I10    Unspecified hypothyroidism E03.9    CAD (coronary artery disease) I25.10    Gout M10.9    CKD (chronic kidney disease) stage 3, GFR 30-59 ml/min N18.3    Other and unspecified hyperlipidemia E78.5    Rhabdomyolysis M62.82    Weakness of both legs R29.898    Debility R53.81    At risk for falls Z91.81    Dehydration, moderate E86.0    Acute respiratory failure (HCC) J96.00    Aspiration pneumonia (HCC) J69.0    Lactic acidosis E87.2    Dementia F03.90       Respiratory Therapist: Esther Kumar V RT

## 2018-01-01 NOTE — PROGRESS NOTES
Initial Nutrition Assessment:    INTERVENTIONS/RECOMMENDATIONS:   · Diet per SLP  · Glucerna shakes BID and MC daily    ASSESSMENT:   Chart reviewed, medically noted for aspiration PNA, Dementia and PMH shown below. Visited with pt but unable to obtain detailed nutrition history. Per chart review, pt has experienced a 14 lbs (9%) weight loss in the past month. Will add glucerna shakes to diet order. Noted for elevated BG. 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    Gout     Hyperlipidemia     Hypertension     Hypothyroidism     Other ill-defined conditions(799.89)     sleep apnea    Other ill-defined conditions(799.89)     Cardiomyopathy    Syncope     Low blood sugar       Diet Order: Puree  % Eaten:  No data found.     Pertinent Medications: [x]Reviewed: D5 NS, pepcid, humalog  Pertinent Labs: [x]Reviewed: BG>200, HA1c 7.2  Food Allergies: [x]NKFA  []Other   Last BM: 12/29  Edema:        []RUE   []LUE   []RLE   []LLE      Pressure Injury:      [] Stage I   [] Stage II   [] Stage III   [] Stage IV      Wt Readings from Last 30 Encounters:   01/01/18 61.9 kg (136 lb 6 oz)   12/12/17 68 kg (150 lb)   07/31/17 71.4 kg (157 lb 6.5 oz)   03/31/16 68.5 kg (151 lb)   05/30/12 68.5 kg (151 lb)   12/02/10 57.4 kg (126 lb 8 oz)   08/23/10 52.4 kg (115 lb 8.3 oz)   06/16/10 58.1 kg (128 lb)       Anthropometrics:   Height: 5' 1\" (154.9 cm) Weight: 61.9 kg (136 lb 6 oz)   IBW (%IBW):   ( ) UBW (%UBW):   (  %)   Last Weight Metrics:  Weight Loss Metrics 1/1/2018 12/12/2017 7/31/2017 3/31/2016 5/30/2012 12/2/2010 8/23/2010   Today's Wt 136 lb 6 oz 150 lb 157 lb 6.5 oz 151 lb 151 lb 126 lb 8 oz 115 lb 8.3 oz   BMI 25.77 kg/m2 28.34 kg/m2 29.74 kg/m2 29.49 kg/m2 29.49 kg/m2 25.54 kg/m2 23.32 kg/m2       BMI: Body mass index is 25.77 kg/(m^2). This BMI is indicative of:   []Underweight    []Normal    [x]Overweight    [] Obesity   [] Extreme Obesity (BMI>40)     Estimated Nutrition Needs (Based on):   1150 Kcals/day (BMR: 960 x 1.2) , 60 g (1 g/kg) Protein  Carbohydrate: At Least 130 g/day  Fluids: 1150 mL/day (1ml/kcal) or per primary team    NUTRITION DIAGNOSES:   Problem:  Inadequate protein-energy intake      Etiology: related to dementia     Signs/Symptoms: as evidenced by 9% weight loss x 1 month      NUTRITION INTERVENTIONS:  Meals/Snacks: General/healthful diet   Supplements: Commercial supplement              GOAL:   consume >50% of meals and ONS in 2-4 days    LEARNING NEEDS (Diet, Food/Nutrient-Drug Interaction):    [x] None Identified   [] Identified and Education Provided/Documented   [] Identified and Pt declined/was not appropriate     Cultureal, Mandaen, OR Ethnic Dietary Needs:    [x] None Identified   [] Identified and Addressed     [x] Interdisciplinary Care Plan Reviewed/Documented    [x] Discharge Planning:   General healthy diet with consistency per SLP recommendations     MONITORING /EVALUATION:      Food/Nutrient Intake Outcomes:  Total energy intake  Physical Signs/Symptoms Outcomes: Weight/weight change, Electrolyte and renal profile, Glucose profile, GI    NUTRITION RISK:    [x] High              [] Moderate           []  Low  []  Minimal/Uncompromised    PT SEEN FOR:    []  MD Consult: []Calorie Count      []Diabetic Diet Education        []Diet Education     []Electrolyte Management     []General Nutrition Management and Supplements     []Management of Tube Feeding     []TPN Recommendations    [x]  RN Referral:  [x]MST score >=2     []Enteral/Parenteral Nutrition PTA     []Pregnant: Gestational DM or Multigestation     []Pressure Ulcer/Wound Care needs        []  Low BMI  []  LOS Referral       Joel King RDN  Pager [phone]  Weekend Pager [phone]

## 2018-01-01 NOTE — PROGRESS NOTES
Bedside shift change report given to Alisia (oncoming nurse) by Benjamin Lozano (offgoing nurse). Report included the following information SBAR, Kardex, Intake/Output, MAR and Recent Results. SHIFT SUMMARY:            [facility] NURSING NOTE   Admission Date 12/29/2017   Admission Diagnosis Acute respiratory failure ([address]   Consults None      Cardiac Monitoring [x] Yes [] No      Purposeful Hourly Rounding [x] Yes    Billie Score Total Score: 4   Billie score 3 or > [x] Bed Alarm [] Avasys [] 1:1 sitter [] Patient refused (Place signed refusal form in chart)   Lewis Score Lewis Score: 17   Lewis score 14 or < [] PMT consult [] Wound Care consult    []  Specialty bed  [] Nutrition consult      Influenza Vaccine             Oxygen needs? [x] Room air Oxygen @  []1L    []2L    []3L   []4L    []5L   []6L     Use home O2? [] Yes [x] No  Perform O2 challenge test using  smartphrase (.Homeoxygen)      Last bowel movement Last Bowel Movement Date: 12/29/17      Urinary Catheter       External Female Catheter 12/30/17-Urine Output (mL): 350 ml     LDAs               Peripheral IV 12/29/17 Right Antecubital (Active)   Site Assessment Clean, dry, & intact 12/31/2017  7:26 PM   Phlebitis Assessment 0 12/31/2017  7:26 PM   Infiltration Assessment 0 12/31/2017  7:26 PM   Dressing Status Clean, dry, & intact 12/31/2017  7:26 PM   Dressing Type Transparent 12/31/2017  7:26 PM   Hub Color/Line Status Blue; Infusing 12/31/2017  7:26 PM          External Female Catheter 12/30/17 (Active)   Site Assessment Clean, dry, & intact 12/31/2017  7:27 PM   Repositioned No 12/31/2017  7:18 AM   Perineal Care No 12/31/2017  7:18 AM   Wick Changed No 12/31/2017  7:18 AM   Suction Canister/Tubing Changed No 12/31/2017  7:18 AM   Urine Output (mL) 350 ml 12/31/2017  7:18 AM                   Readmission Risk Assessment Tool Score High Risk            41       Total Score        3 Has Seen PCP in Last 6 Months (Yes=3, No=0)    5 Pt.  Coverage (Medicare=5 , Medicaid, or Self-Pay=4)    33 Charlson Comorbidity Score (Age + Comorbid Conditions)        Criteria that do not apply:    . Living with Significant Other. Assisted Living. LTAC. SNF.  or   Rehab    Patient Length of Stay (>5 days = 3)    IP Visits Last 12 Months (1-3=4, 4=9, >4=11)       Expected Length of Stay - - -   Actual Length of Stay 2

## 2018-01-01 NOTE — PROGRESS NOTES
Problem: Dysphagia (Adult)  Goal: *Acute Goals and Plan of Care (Insert Text)  Speech pathology goals initiated 1/1/2018   1. Patient will tolerate a puree diet/ thin liquids free of s/s aspiration within 7 days  2. Patient will demonstrate timely and complete mastication of solid trials with slp within 7 days  Speech LAnguage Pathology bedside swallow evaluation  Patient: Nelia Martinez (41 y.o. female)  Date: 1/1/2018  Primary Diagnosis: Acute respiratory failure ([address]        Precautions: Aspiration       ASSESSMENT :  Note patient came from [facility] with respiratory distress after vomiting (and suspected aspiration from vomiting). She is typically on a regular consistency diet at [facility]. Patient pleasantly confused, unable to answer orientation questions. Her teeth are covered in thick, dried secretions but she became agitated at attempts to provide oral care. Based on the objective data described below, the patient presents with suspected mild-mod oral dysphagia and functional pharyngeal swallow given advanced age of 80. Patient with prolonged mastication of solids and mild lingual residue requiring cued liquid wash. Pharyngeal swallow initiation is suspected to be mildly delayed, though expected for age. No overt s/s aspiration with successive straw sips of thin, puree or solid. Increased risk of aspiration and aspiration PNA given baseline dementia and poor oral hygiene that she is resistant to allowing care for. Unsure if patient's mentation is off from baseline but suspect she will be able to progress back to regular diet once mentation improves or is in a familiar environment. Patient will benefit from skilled intervention to address the above impairments.   Patients rehabilitation potential is considered to be Fair  Factors which may influence rehabilitation potential include:    []            None noted  [x]            Mental ability/status  []            Medical condition  [] Home/family situation and support systems  []            Safety awareness  []            Pain tolerance/management  []            Other:      PLAN :  Recommendations and Planned Interventions:  Puree diet/ thin liquids. Straws ok  1:1 assistance with meals  STRICT upright positioning with all meals (will need to  Be repositioned)  CRUSH meds    Frequency/Duration: Patient will be followed by speech-language pathology 2-3 times a week to address goals. Discharge Recommendations: back to [facility]      SUBJECTIVE:   Patient stated Stop that!!! When attempting oral care. Attempted to have patient provide her own oral care with green swabs but it was brief and ineffective.     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    Gout     Hyperlipidemia     Hypertension     Hypothyroidism     Other ill-defined conditions(799.89)     sleep apnea    Other ill-defined conditions(799.89)     Cardiomyopathy    Syncope     Low blood sugar     Past Surgical History:   Procedure Laterality Date    CARDIAC SURG PROCEDURE UNLIST      defibrillator    HX ORTHOPAEDIC      knee surgery    HX PACEMAKER  09/23/2004    / dual lead     Prior Level of Function/Home Situation:   Home Situation  Home Environment: Long term care  One/Two Story Residence: One story  Living Alone: No  Support Systems: Child(ladonna)  Patient Expects to be Discharged to[de-identified] Skilled nursing facility  Current DME Used/Available at Home: Oxygen, portable, Walker, rollator  Diet prior to admission: regular/thin   Current Diet:  NPO   Cognitive and Communication Status:  Neurologic State: Alert, Confused  Orientation Level: Unable to verbalize  Cognition: Decreased command following  Perception: Appears intact  Perseveration: No perseveration noted  Safety/Judgement: Not assessed  Oral Assessment:  Oral Assessment  Labial: No impairment  Dentition: Natural  Oral Hygiene:  (thick dried secretions covering teeth)  Lingual: No impairment  Velum: Unable to visualize  Mandible: No impairment  P.O. Trials:  Patient Position:  (upright in bed)  Vocal quality prior to P.O.: No impairment  Consistency Presented: Thin liquid;Puree; Solid  How Presented: Successive swallows;Straw;Self-fed/presented     Bolus Acceptance: No impairment  Bolus Formation/Control: Impaired  Type of Impairment: Delayed;Mastication  Propulsion: Delayed (# of seconds)  Oral Residue: None  Initiation of Swallow: Delayed (# of seconds)  Laryngeal Elevation: Functional  Aspiration Signs/Symptoms: None  Pharyngeal Phase Characteristics: No impairment, issues, or problems   Effective Modifications: None          Oral Phase Severity: Mild-moderate  Pharyngeal Phase Severity : No impairment    NOMS:   The NOMS functional outcome measure was used to quantify this patient's level of swallowing impairment. Based on the NOMS, the patient was determined to be at level 4 for swallow function     G Codes: In compliance with CMSs Claims Based Outcome Reporting, the following G-code set was chosen for this patient based the use of the NOMS functional outcome to quantify this patient's level of swallowing impairment. Using the NOMS, the patient was determined to be at level 4 for swallow function which correlates with the CK= 40-59% level of severity.     Based on the objective assessment provided within this note, the current, goal, and discharge g-codes are as follows:    Swallow  Swallowing:   Swallow Current Status CK= 40-59%   Swallow Goal Status CI= 1-19%      NOMS Swallowing Levels:  Level 1 (CN): NPO  Level 2 (CM): NPO but takes consistency in therapy  Level 3 (CL): Takes less than 50% of nutrition p.o. and continues with nonoral feedings; and/or safe with mod cues; and/or max diet restriction  Level 4 (CK): Safe swallow but needs mod cues; and/or mod diet restriction; and/or still requires some nonoral feeding/supplements  Level 5 (CJ): Safe swallow with min diet restriction; and/or needs min cues  Level 6 (CI): Independent with p.o.; rare cues; usually self cues; may need to avoid some foods or needs extra time  Level 7 ([address] [place] [address]): Independent for all p.o.  YURY. (2003). National Outcomes Measurement System (NOMS): Adult Speech-Language Pathology User's Guide. Pain:  Pain Scale 1: Numeric (0 - 10)  Pain Intensity 1: 0     After treatment:   []            Patient left in no apparent distress sitting up in chair  [x]            Patient left in no apparent distress in bed  [x]            Call bell left within reach  [x]            Nursing notified  []            Caregiver present  []            Bed alarm activated    COMMUNICATION/EDUCATION:   The patients plan of care including recommendations, planned interventions, and recommended diet changes were discussed with: Registered Nurse. []            Posted safety precautions in patient's room. [x]            Patient/family have participated as able in goal setting and plan of care. [x]            Patient/family agree to work toward stated goals and plan of care. []            Patient understands intent and goals of therapy, but is neutral about his/her participation. []            Patient is unable to participate in goal setting and plan of care.     Thank you for this referral.  Halina Sawyer M.S. CCC-SLP  Time Calculation: 19 mins

## 2018-01-02 LAB
ANION GAP SERPL CALC-SCNC: 9 MMOL/L (ref 5–15)
BUN SERPL-MCNC: 42 MG/DL (ref 6–20)
BUN/CREAT SERPL: 23 (ref 12–20)
CALCIUM SERPL-MCNC: 8.5 MG/DL (ref 8.5–10.1)
CHLORIDE SERPL-SCNC: 113 MMOL/L (ref 97–108)
CO2 SERPL-SCNC: 25 MMOL/L (ref 21–32)
CREAT SERPL-MCNC: 1.81 MG/DL (ref 0.55–1.02)
ERYTHROCYTE [DISTWIDTH] IN BLOOD BY AUTOMATED COUNT: 16.3 % (ref 11.5–14.5)
GLUCOSE BLD STRIP.AUTO-MCNC: 160 MG/DL (ref 65–100)
GLUCOSE BLD STRIP.AUTO-MCNC: 203 MG/DL (ref 65–100)
GLUCOSE BLD STRIP.AUTO-MCNC: 204 MG/DL (ref 65–100)
GLUCOSE BLD STRIP.AUTO-MCNC: 218 MG/DL (ref 65–100)
GLUCOSE SERPL-MCNC: 195 MG/DL (ref 65–100)
HCT VFR BLD AUTO: 32.2 % (ref 35–47)
HGB BLD-MCNC: 9.9 G/DL (ref 11.5–16)
LACTATE SERPL-SCNC: 1.2 MMOL/L (ref 0.4–2)
MCH RBC QN AUTO: 29.7 PG (ref 26–34)
MCHC RBC AUTO-ENTMCNC: 30.7 G/DL (ref 30–36.5)
MCV RBC AUTO: 96.7 FL (ref 80–99)
PLATELET # BLD AUTO: 265 K/UL (ref 150–400)
POTASSIUM SERPL-SCNC: 3.4 MMOL/L (ref 3.5–5.1)
RBC # BLD AUTO: 3.33 M/UL (ref 3.8–5.2)
SERVICE CMNT-IMP: ABNORMAL
SODIUM SERPL-SCNC: 147 MMOL/L (ref 136–145)
WBC # BLD AUTO: 11.9 K/UL (ref 3.6–11)

## 2018-01-02 PROCEDURE — 74011250637 HC RX REV CODE- 250/637: Performed by: INTERNAL MEDICINE

## 2018-01-02 PROCEDURE — 83605 ASSAY OF LACTIC ACID: CPT | Performed by: INTERNAL MEDICINE

## 2018-01-02 PROCEDURE — 74011636637 HC RX REV CODE- 636/637: Performed by: INTERNAL MEDICINE

## 2018-01-02 PROCEDURE — 74011250636 HC RX REV CODE- 250/636: Performed by: EMERGENCY MEDICINE

## 2018-01-02 PROCEDURE — 82962 GLUCOSE BLOOD TEST: CPT

## 2018-01-02 PROCEDURE — 74011000258 HC RX REV CODE- 258: Performed by: INTERNAL MEDICINE

## 2018-01-02 PROCEDURE — 85027 COMPLETE CBC AUTOMATED: CPT | Performed by: INTERNAL MEDICINE

## 2018-01-02 PROCEDURE — 74011250636 HC RX REV CODE- 250/636: Performed by: INTERNAL MEDICINE

## 2018-01-02 PROCEDURE — 80048 BASIC METABOLIC PNL TOTAL CA: CPT | Performed by: INTERNAL MEDICINE

## 2018-01-02 PROCEDURE — 36415 COLL VENOUS BLD VENIPUNCTURE: CPT | Performed by: INTERNAL MEDICINE

## 2018-01-02 PROCEDURE — 65660000000 HC RM CCU STEPDOWN

## 2018-01-02 PROCEDURE — 74011000250 HC RX REV CODE- 250: Performed by: INTERNAL MEDICINE

## 2018-01-02 RX ORDER — INSULIN GLARGINE 100 [IU]/ML
10 INJECTION, SOLUTION SUBCUTANEOUS DAILY
Status: DISCONTINUED | OUTPATIENT
Start: 2018-01-02 | End: 2018-01-05 | Stop reason: HOSPADM

## 2018-01-02 RX ORDER — POTASSIUM CHLORIDE 750 MG/1
10 TABLET, FILM COATED, EXTENDED RELEASE ORAL 2 TIMES DAILY
Status: DISCONTINUED | OUTPATIENT
Start: 2018-01-02 | End: 2018-01-03

## 2018-01-02 RX ADMIN — INSULIN LISPRO 2 UNITS: 100 INJECTION, SOLUTION INTRAVENOUS; SUBCUTANEOUS at 08:57

## 2018-01-02 RX ADMIN — Medication 10 ML: at 20:03

## 2018-01-02 RX ADMIN — POTASSIUM CHLORIDE 10 MEQ: 750 TABLET, FILM COATED, EXTENDED RELEASE ORAL at 08:55

## 2018-01-02 RX ADMIN — Medication 10 ML: at 13:45

## 2018-01-02 RX ADMIN — METOPROLOL TARTRATE 5 MG: 5 INJECTION, SOLUTION INTRAVENOUS at 08:56

## 2018-01-02 RX ADMIN — LEVOTHYROXINE SODIUM 88 MCG: 88 TABLET ORAL at 08:55

## 2018-01-02 RX ADMIN — METOPROLOL TARTRATE 5 MG: 5 INJECTION, SOLUTION INTRAVENOUS at 11:55

## 2018-01-02 RX ADMIN — METOPROLOL TARTRATE 5 MG: 5 INJECTION, SOLUTION INTRAVENOUS at 18:23

## 2018-01-02 RX ADMIN — METOPROLOL TARTRATE 5 MG: 5 INJECTION, SOLUTION INTRAVENOUS at 03:29

## 2018-01-02 RX ADMIN — METOPROLOL TARTRATE 5 MG: 5 INJECTION, SOLUTION INTRAVENOUS at 23:20

## 2018-01-02 RX ADMIN — INSULIN LISPRO 3 UNITS: 100 INJECTION, SOLUTION INTRAVENOUS; SUBCUTANEOUS at 18:22

## 2018-01-02 RX ADMIN — INSULIN LISPRO 3 UNITS: 100 INJECTION, SOLUTION INTRAVENOUS; SUBCUTANEOUS at 11:55

## 2018-01-02 RX ADMIN — POTASSIUM CHLORIDE 10 MEQ: 750 TABLET, FILM COATED, EXTENDED RELEASE ORAL at 18:22

## 2018-01-02 RX ADMIN — HEPARIN SODIUM 5000 UNITS: 5000 INJECTION, SOLUTION INTRAVENOUS; SUBCUTANEOUS at 13:45

## 2018-01-02 RX ADMIN — METOPROLOL TARTRATE 5 MG: 5 INJECTION, SOLUTION INTRAVENOUS at 20:03

## 2018-01-02 RX ADMIN — ALLOPURINOL 100 MG: 100 TABLET ORAL at 09:18

## 2018-01-02 RX ADMIN — AMLODIPINE BESYLATE 5 MG: 5 TABLET ORAL at 08:56

## 2018-01-02 RX ADMIN — INSULIN GLARGINE 10 UNITS: 100 INJECTION, SOLUTION SUBCUTANEOUS at 08:56

## 2018-01-02 RX ADMIN — Medication 10 ML: at 03:29

## 2018-01-02 RX ADMIN — HEPARIN SODIUM 5000 UNITS: 5000 INJECTION, SOLUTION INTRAVENOUS; SUBCUTANEOUS at 03:29

## 2018-01-02 RX ADMIN — HEPARIN SODIUM 5000 UNITS: 5000 INJECTION, SOLUTION INTRAVENOUS; SUBCUTANEOUS at 20:02

## 2018-01-02 RX ADMIN — CEFTRIAXONE 1 G: 1 INJECTION, POWDER, FOR SOLUTION INTRAMUSCULAR; INTRAVENOUS at 09:09

## 2018-01-02 RX ADMIN — ATORVASTATIN CALCIUM 40 MG: 40 TABLET, FILM COATED ORAL at 08:56

## 2018-01-02 RX ADMIN — FAMOTIDINE 20 MG: 20 TABLET, FILM COATED ORAL at 08:55

## 2018-01-02 NOTE — PROGRESS NOTES
1100: Cardiopulmonary Care Interdisciplinary rounds were held today to discuss patient plan of care and outcomes. The following members were present: NP/Physician, PT, Pharmacy, Nursing, Nutritionist and Case Management.     Expected Length of Stay:  3d 16h  Geometric Length of Stay: DRG GLOS: 3.7    Plan of Care: Continue current treatment plan  D/c to [facility]

## 2018-01-02 NOTE — PROGRESS NOTES
CM contacted Dot Branch at [facility] to provide treatment updates for pt. CM sent referral to [facility] via [facility], pt accepted. CM will continue to provide updates and arranged transportation via [facility] at discharge for pt.      BRUCE Reich Supervisee in Social Work, [employer]  [phone]

## 2018-01-02 NOTE — PROGRESS NOTES
Bedside and Verbal shift change report given to Feliciano Libman, RN (oncoming nurse) by Melvi Haddad (offgoing nurse). Report included the following information SBAR. SHIFT SUMMARY:            [facility] NURSING NOTE   Admission Date 12/29/2017   Admission Diagnosis Acute respiratory failure ([address]   Consults None      Cardiac Monitoring [] Yes [] No      Purposeful Hourly Rounding [] Yes    Billie Score Total Score: 4   Billie score 3 or > [] Bed Alarm [] [employer] [] 1:1 sitter [] Patient refused (Place signed refusal form in chart)   Lewis Score Lewis Score: 15   Lewis score 14 or < [] PMT consult [] Wound Care consult    []  Specialty bed  [] Nutrition consult      Influenza Vaccine             Oxygen needs? [] Room air Oxygen @  []1L    []2L    []3L   []4L    []5L   []6L     Use home O2? [] Yes [] No  Perform O2 challenge test using  smartphrase (.Homeoxygen)      Last bowel movement Last Bowel Movement Date: 01/01/18      Urinary Catheter       External Female Catheter 12/30/17-Urine Output (mL): 500 ml     LDAs               Peripheral IV 01/01/18 Left Arm (Active)   Site Assessment Clean, dry, & intact 1/1/2018  7:35 PM   Phlebitis Assessment 0 1/1/2018  7:35 PM   Infiltration Assessment 0 1/1/2018  7:35 PM   Dressing Status Clean, dry, & intact 1/1/2018  7:35 PM   Dressing Type Transparent 1/1/2018  7:35 PM   Hub Color/Line Status Yellow;Capped 1/1/2018  7:35 PM          External Female Catheter 12/30/17 (Active)   Site Assessment Clean, dry, & intact 12/31/2017 11:11 PM   Repositioned Yes 12/31/2017 11:11 PM   Perineal Care Yes 12/31/2017 11:11 PM   Wick Changed Yes 12/31/2017 11:11 PM   Suction Canister/Tubing Changed Yes 12/31/2017 11:11 PM   Urine Output (mL) 500 ml 12/31/2017 11:11 PM                   Readmission Risk Assessment Tool Score High Risk            41       Total Score        3 Has Seen PCP in Last 6 Months (Yes=3, No=0)    5 Pt.  Coverage (Medicare=5 , Medicaid, or Self-Pay=4)    33 Charlson Comorbidity Score (Age + Comorbid Conditions)        Criteria that do not apply:    . Living with Significant Other. Assisted Living. LTAC. SNF.  or   Rehab    Patient Length of Stay (>5 days = 3)    IP Visits Last 12 Months (1-3=4, 4=9, >4=11)       Expected Length of Stay - - -   Actual Length of Stay 3

## 2018-01-02 NOTE — PROGRESS NOTES
Bedside shift change report given to Daria Simpson (oncoming nurse) by Pineda Frazier (offgoing nurse). Report included the following information SBAR, Kardex, Intake/Output, MAR and Recent Results. SHIFT SUMMARY:            [address] NURSING NOTE   Admission Date 12/29/2017   Admission Diagnosis Acute respiratory failure ([address]   Consults None      Cardiac Monitoring [x] Yes [] No      Purposeful Hourly Rounding [x] Yes    Billie Score Total Score: 4   Billie score 3 or > [x] Bed Alarm [x] [employer]asys [] 1:1 sitter [] Patient refused (Place signed refusal form in chart)   Lewis Score Lewis Score: 15   Lewis score 14 or < [] PMT consult [] Wound Care consult    []  Specialty bed  [] Nutrition consult      Influenza Vaccine             Oxygen needs? [x] Room air Oxygen @  []1L    []2L    []3L   []4L    []5L   []6L     Use home O2? [] Yes [x] No  Perform O2 challenge test using  smartphrase (.Homeoxygen)      Last bowel movement Last Bowel Movement Date: 01/01/18      Urinary Catheter       External Female Catheter 12/30/17-Urine Output (mL): 500 ml     LDAs               Peripheral IV 01/01/18 Left Arm (Active)   Site Assessment Clean, dry, & intact 1/1/2018  7:35 PM   Phlebitis Assessment 0 1/1/2018  7:35 PM   Infiltration Assessment 0 1/1/2018  7:35 PM   Dressing Status Clean, dry, & intact 1/1/2018  7:35 PM   Dressing Type Transparent 1/1/2018  7:35 PM   Hub Color/Line Status Yellow;Capped 1/1/2018  7:35 PM          External Female Catheter 12/30/17 (Active)   Site Assessment Clean, dry, & intact 12/31/2017 11:11 PM   Repositioned Yes 12/31/2017 11:11 PM   Perineal Care Yes 12/31/2017 11:11 PM   Wick Changed Yes 12/31/2017 11:11 PM   Suction Canister/Tubing Changed Yes 12/31/2017 11:11 PM   Urine Output (mL) 500 ml 12/31/2017 11:11 PM                   Readmission Risk Assessment Tool Score High Risk            41       Total Score        3 Has Seen PCP in Last 6 Months (Yes=3, No=0)    5 Pt.  Coverage (Medicare=5 , Medicaid, or Self-Pay=4)    33 Charlson Comorbidity Score (Age + Comorbid Conditions)        Criteria that do not apply:    . Living with Significant Other. Assisted Living. LTAC. SNF.  or   Rehab    Patient Length of Stay (>5 days = 3)    IP Visits Last 12 Months (1-3=4, 4=9, >4=11)       Expected Length of Stay - - -   Actual Length of Stay 3

## 2018-01-02 NOTE — PROGRESS NOTES
Pressure Ulcer Prevention Alert Received for Lewis < 14 (moderate risk).        Care Plan/Interventions for Nursin. Complete Lewis Pressure Ulcer Risk Scale and use sub scores to identify appropriate interventions. 2. Perform Assessment: skin, changes in LOC, visual cues for pain, monitor skin under medical devices  3. Respond to Reduced Sensory Perception: changes in LOC, check visual cues for pain, float heels, suspension boots, pressure redistribution bed/mattress/chair cushion, turning and reposition approximately every 2 hours (pillows & wedges), pad between skin to skin, turn & reposition  4. Manage Moisture: absorbent under pads, internal / external urinary device, internal /  external fecal device, minimize layers, contain wound drainage, access need for specialty bed, limit adult briefs, maintain skin hydration (lotion/cream), moisture barrier, offer toileting every hour  5. Promote Activity: increase time out of bed, chair cushion, PT/OT evaluation, trapeze to reposition, pressure redistribution bed/mattress/chair  6. Address Reduced Mobility: float heels / suspension boot, HOB 30 degrees or less, pressure redistribution bed/mattress/cushion, PT / OT evaluation, turn and reposition approximately every 2 hours (pillows & wedges)  7. Promote Nutrition: document food / fluid / supplement intake, encourage/assist with meals as needed  8. Reduce Friction and Shear: transferring/repositioning devices (lift/draw sheet), lift team/ patient mobility team, feet elevated on foot rest, minimize layers, foam dressing / transparent film / skin sealants, protective barrier creams and emollients, transfer aides (board, Imtiaz lift, ceiling lift, stand assist), HOB 30 degrees or less, trapeze to reposition.   Wound Care Team

## 2018-01-02 NOTE — PROGRESS NOTES
PROGRESS NOTE    NAME:  Anjelica May   :   10/31/1924   MRN:   [id number]     Date/Time:  2018 7:20 AM  Subjective:   History: admitted with UTI & RLL pneumonia.       Medications reviewed:  Current Facility-Administered Medications   Medication Dose Route Frequency    albuterol-ipratropium (DUO-NEB) 2.5 MG-0.5 MG/3 ML  3 mL Nebulization Q6H PRN    LORazepam (ATIVAN) injection 0.5 mg  0.5 mg IntraVENous QHS PRN    glucose chewable tablet 16 g  4 Tab Oral PRN    dextrose (D50W) injection syrg 12.5-25 g  12.5-25 g IntraVENous PRN    glucagon (GLUCAGEN) injection 1 mg  1 mg IntraMUSCular PRN    insulin lispro (HUMALOG) injection   SubCUTAneous AC&HS    piperacillin-tazobactam (ZOSYN) 3.375 g in  ml premix/cmpd  3.375 g IntraVENous Q12H    sodium chloride (NS) flush 5-10 mL  5-10 mL IntraVENous Q8H    acetaminophen (TYLENOL) tablet 650 mg  650 mg Oral Q6H PRN    heparin (porcine) injection 5,000 Units  5,000 Units SubCUTAneous Q8H    allopurinol (ZYLOPRIM) tablet 100 mg  100 mg Oral DAILY    amLODIPine (NORVASC) tablet 5 mg  5 mg Oral DAILY    atorvastatin (LIPITOR) tablet 40 mg  40 mg Oral DAILY    clopidogrel (PLAVIX) tablet 75 mg  75 mg Oral DAILY    levothyroxine (SYNTHROID) tablet 88 mcg  88 mcg Oral ACB    famotidine (PEPCID) tablet 20 mg  20 mg Oral DAILY    sodium chloride (NS) flush 5-10 mL  5-10 mL IntraVENous PRN    metoprolol (LOPRESSOR) injection 5 mg  5 mg IntraVENous Q4H        Objective:   Vitals:  Visit Vitals    /75 (BP 1 Location: Right arm, BP Patient Position: At rest)    Pulse 83    Temp 97.4 °F (36.3 °C)    Resp 18    Ht 5' 1\" (1.549 m)    Wt 136 lb 4 oz (61.8 kg)    SpO2 92%    BMI 25.74 kg/m2    O2 Flow Rate (L/min): 2 l/min O2 Device: Room air Temp (24hrs), Av.6 °F (36.4 °C), Min:97.4 °F (36.3 °C), Max:98.1 °F (36.7 °C)      Last 24hr Input/Output:    Intake/Output Summary (Last 24 hours) at 18 82  Last data filed at 18 5337   Gross per 24 hour   Intake              410 ml   Output                0 ml   Net              410 ml        PHYSICAL EXAM:  General:     Alert, cooperative, no distress, appears stated age. Head:    Normocephalic, without obvious abnormality, atraumatic. Eyes:    Conjunctivae/corneas clear. PERRLA  Nose:   Nares normal. No drainage or sinus tenderness. Throat:     Lips, mucosa, and tongue normal.  No Thrush  Neck:   Supple, symmetrical,  no adenopathy, thyroid: non tender     no carotid bruit and no JVD. Back:     Symmetric,  No CVA tenderness. Lungs:   Coarse rales at both bases  Heart:    Regular rate and rhythm,  no murmur, rub or gallop. Abdomen:    Soft, non-tender. Not distended. Bowel sounds normal. No masses  Extremities:  Extremities normal, atraumatic, No cyanosis. No edema. No clubbing  Lymph nodes:  Cervical, supraclavicular normal.  Neurologic:  Normal strength, Alert and oriented X 0.    Skin:                No rash      Lab Data Reviewed:    Recent Results (from the past 24 hour(s))   GLUCOSE, POC    Collection Time: 01/01/18  8:55 AM   Result Value Ref Range    Glucose (POC) 236 (H) 65 - 100 mg/dL    Performed by [address] [place] [address], POC    Collection Time: 01/01/18 11:09 AM   Result Value Ref Range    Glucose (POC) 258 (H) 65 - 100 mg/dL    Performed by Francie Arevalo (PCT)    GLUCOSE, POC    Collection Time: 01/01/18  5:23 PM   Result Value Ref Range    Glucose (POC) 146 (H) 65 - 100 mg/dL    Performed by Francie Arevalo (PCT)    GLUCOSE, POC    Collection Time: 01/01/18  8:36 PM   Result Value Ref Range    Glucose (POC) 199 (H) 65 - 100 mg/dL    Performed by Isadora Campos ([facility])    LACTIC ACID    Collection Time: 01/02/18  2:15 AM   Result Value Ref Range    Lactic acid 1.2 0.4 - 2.0 MMOL/L   CBC W/O DIFF    Collection Time: 01/02/18  2:15 AM   Result Value Ref Range    WBC 11.9 (H) 3.6 - 11.0 K/uL    RBC 3.33 (L) 3.80 - 5.20 M/uL    HGB 9.9 (L) 11.5 - 16.0 g/dL    HCT 32.2 (L) 35.0 - 47.0 % MCV 96.7 80.0 - 99.0 FL    MCH 29.7 26.0 - 34.0 PG    MCHC 30.7 30.0 - 36.5 g/dL    RDW 16.3 (H) 11.5 - 14.5 %    PLATELET 491 074 - 442 K/uL   METABOLIC PANEL, BASIC    Collection Time: 01/02/18  2:15 AM   Result Value Ref Range    Sodium 147 (H) 136 - 145 mmol/L    Potassium 3.4 (L) 3.5 - 5.1 mmol/L    Chloride 113 (H) 97 - 108 mmol/L    CO2 25 21 - 32 mmol/L    Anion gap 9 5 - 15 mmol/L    Glucose 195 (H) 65 - 100 mg/dL    BUN 42 (H) 6 - 20 MG/DL    Creatinine 1.81 (H) 0.55 - 1.02 MG/DL    BUN/Creatinine ratio 23 (H) 12 - 20      GFR est AA 32 (L) >60 ml/min/1.73m2    GFR est non-AA 26 (L) >60 ml/min/1.73m2    Calcium 8.5 8.5 - 10.1 MG/DL   GLUCOSE, POC    Collection Time: 01/02/18  7:12 AM   Result Value Ref Range    Glucose (POC) 204 (H) 65 - 100 mg/dL    Performed by Karyn MORENO(GIRISH)          Assessment/Plan:     Principal Problem:    Acute respiratory failure ([facility] (12/29/2017)    Active Problems:    DM (diabetes mellitus), type 2, uncontrolled, with renal complications ([facility]) (9/64/2147)      CKD (chronic kidney disease) stage 3, GFR 30-59 ml/min (8/23/2010)      Aspiration pneumonia ([facility]) (12/30/2017)      Lactic acidosis (12/30/2017)      Dementia (12/30/2017)       ___________________________________________________  PLAN:    1. Simplify antibiotic regimen   2. Return to long term care soon.   3.  :  4.            ___________________________________________________    Attending Physician: Margaret Melissa MD

## 2018-01-02 NOTE — PROGRESS NOTES
Bedside shift change report given to Terri Jeffrey RN (oncoming nurse) by ΣΑΡΑΝISIS STREETER (offgoing nurse). Report included the following information SBAR. Bedside shift change report given to [facility] (oncoming nurse) by Terri Jeffrey RN (offgoing nurse). Report included the following information SBAR. SHIFT SUMMARY:            [facility] NURSING NOTE   Admission Date 12/29/2017   Admission Diagnosis Acute respiratory failure ([address]   Consults None      Cardiac Monitoring [x] Yes [] No      Purposeful Hourly Rounding [x] Yes    Billie Score Total Score: 4   Billie score 3 or > [x] Bed Alarm [] [employer]ys [] 1:1 sitter [] Patient refused (Place signed refusal form in chart)   Lewis Score Lewis Score: 15   Lewis score 14 or < [] PMT consult [] Wound Care consult    []  Specialty bed  [] Nutrition consult      Influenza Vaccine Received Flu Vaccine for Current Season (usually Sept-March): Unsure    Patient/Guardian Refused (Notify MD): Yes (called Kervin MACKENZIE - refused)      Oxygen needs?  [x] Room air Oxygen @  []1L    []2L    []3L   []4L    []5L   []6L     Use home O2? [] Yes [x] No  Perform O2 challenge test using  smartphrase (.Homeoxygen)      Last bowel movement Last Bowel Movement Date: 01/01/18      Urinary Catheter       External Female Catheter 12/30/17-Urine Output (mL): 500 ml     LDAs               Peripheral IV 01/01/18 Left Arm (Active)   Site Assessment Clean, dry, & intact 1/2/2018  4:00 PM   Phlebitis Assessment 0 1/2/2018  4:00 PM   Infiltration Assessment 0 1/2/2018  4:00 PM   Dressing Status Clean, dry, & intact 1/2/2018  4:00 PM   Dressing Type Tape;Transparent 1/2/2018  4:00 PM   Hub Color/Line Status Yellow 1/2/2018  4:00 PM          External Female Catheter 12/30/17 (Active)   Site Assessment Clean, dry, & intact 12/31/2017 11:11 PM   Repositioned Yes 12/31/2017 11:11 PM   Perineal Care Yes 12/31/2017 11:11 PM   Wick Changed Yes 12/31/2017 11:11 PM   Suction Canister/Tubing Changed Yes 12/31/2017 11:11 PM   Urine Output (mL) 500 ml 12/31/2017 11:11 PM                   Readmission Risk Assessment Tool Score High Risk            41       Total Score        3 Has Seen PCP in Last 6 Months (Yes=3, No=0)    5 Pt. Coverage (Medicare=5 , Medicaid, or Self-Pay=4)    33 Charlson Comorbidity Score (Age + Comorbid Conditions)        Criteria that do not apply:    . Living with Significant Other. Assisted Living. LTAC. SNF.  or   Rehab    Patient Length of Stay (>5 days = 3)    IP Visits Last 12 Months (1-3=4, 4=9, >4=11)       Expected Length of Stay 3d 16h   Actual Length of Stay 4

## 2018-01-03 ENCOUNTER — APPOINTMENT (OUTPATIENT)
Dept: GENERAL RADIOLOGY | Age: 83
DRG: 177 | End: 2018-01-03
Attending: INTERNAL MEDICINE
Payer: MEDICARE

## 2018-01-03 LAB
ANION GAP SERPL CALC-SCNC: 8 MMOL/L (ref 5–15)
BUN SERPL-MCNC: 41 MG/DL (ref 6–20)
BUN/CREAT SERPL: 26 (ref 12–20)
CALCIUM SERPL-MCNC: 8.2 MG/DL (ref 8.5–10.1)
CHLORIDE SERPL-SCNC: 111 MMOL/L (ref 97–108)
CO2 SERPL-SCNC: 24 MMOL/L (ref 21–32)
CREAT SERPL-MCNC: 1.58 MG/DL (ref 0.55–1.02)
ERYTHROCYTE [DISTWIDTH] IN BLOOD BY AUTOMATED COUNT: 16.1 % (ref 11.5–14.5)
GLUCOSE BLD STRIP.AUTO-MCNC: 160 MG/DL (ref 65–100)
GLUCOSE BLD STRIP.AUTO-MCNC: 184 MG/DL (ref 65–100)
GLUCOSE BLD STRIP.AUTO-MCNC: 198 MG/DL (ref 65–100)
GLUCOSE BLD STRIP.AUTO-MCNC: 256 MG/DL (ref 65–100)
GLUCOSE SERPL-MCNC: 148 MG/DL (ref 65–100)
HCT VFR BLD AUTO: 31.4 % (ref 35–47)
HGB BLD-MCNC: 9.7 G/DL (ref 11.5–16)
MCH RBC QN AUTO: 29.6 PG (ref 26–34)
MCHC RBC AUTO-ENTMCNC: 30.9 G/DL (ref 30–36.5)
MCV RBC AUTO: 95.7 FL (ref 80–99)
PLATELET # BLD AUTO: 240 K/UL (ref 150–400)
POTASSIUM SERPL-SCNC: 3.4 MMOL/L (ref 3.5–5.1)
RBC # BLD AUTO: 3.28 M/UL (ref 3.8–5.2)
SERVICE CMNT-IMP: ABNORMAL
SODIUM SERPL-SCNC: 143 MMOL/L (ref 136–145)
WBC # BLD AUTO: 9.9 K/UL (ref 3.6–11)

## 2018-01-03 PROCEDURE — 74011636637 HC RX REV CODE- 636/637: Performed by: INTERNAL MEDICINE

## 2018-01-03 PROCEDURE — 74011250637 HC RX REV CODE- 250/637: Performed by: INTERNAL MEDICINE

## 2018-01-03 PROCEDURE — 74011000250 HC RX REV CODE- 250: Performed by: INTERNAL MEDICINE

## 2018-01-03 PROCEDURE — 85027 COMPLETE CBC AUTOMATED: CPT | Performed by: INTERNAL MEDICINE

## 2018-01-03 PROCEDURE — 74011250636 HC RX REV CODE- 250/636: Performed by: EMERGENCY MEDICINE

## 2018-01-03 PROCEDURE — 65660000000 HC RM CCU STEPDOWN

## 2018-01-03 PROCEDURE — 92526 ORAL FUNCTION THERAPY: CPT

## 2018-01-03 PROCEDURE — 82962 GLUCOSE BLOOD TEST: CPT

## 2018-01-03 PROCEDURE — 80048 BASIC METABOLIC PNL TOTAL CA: CPT | Performed by: INTERNAL MEDICINE

## 2018-01-03 PROCEDURE — 36415 COLL VENOUS BLD VENIPUNCTURE: CPT | Performed by: INTERNAL MEDICINE

## 2018-01-03 PROCEDURE — 74011250636 HC RX REV CODE- 250/636: Performed by: INTERNAL MEDICINE

## 2018-01-03 RX ORDER — POTASSIUM CHLORIDE 750 MG/1
10 TABLET, FILM COATED, EXTENDED RELEASE ORAL 3 TIMES DAILY
Status: DISCONTINUED | OUTPATIENT
Start: 2018-01-03 | End: 2018-01-05 | Stop reason: HOSPADM

## 2018-01-03 RX ORDER — FUROSEMIDE 80 MG/1
80 TABLET ORAL DAILY
Status: DISCONTINUED | OUTPATIENT
Start: 2018-01-03 | End: 2018-01-05 | Stop reason: HOSPADM

## 2018-01-03 RX ORDER — METOPROLOL SUCCINATE 50 MG/1
50 TABLET, EXTENDED RELEASE ORAL DAILY
Status: DISCONTINUED | OUTPATIENT
Start: 2018-01-03 | End: 2018-01-05 | Stop reason: HOSPADM

## 2018-01-03 RX ORDER — LORAZEPAM 2 MG/ML
0.5 INJECTION INTRAMUSCULAR
Status: DISCONTINUED | OUTPATIENT
Start: 2018-01-03 | End: 2018-01-05 | Stop reason: ALTCHOICE

## 2018-01-03 RX ADMIN — INSULIN LISPRO 2 UNITS: 100 INJECTION, SOLUTION INTRAVENOUS; SUBCUTANEOUS at 12:18

## 2018-01-03 RX ADMIN — Medication 10 ML: at 21:08

## 2018-01-03 RX ADMIN — Medication 10 ML: at 03:07

## 2018-01-03 RX ADMIN — ALLOPURINOL 100 MG: 100 TABLET ORAL at 10:16

## 2018-01-03 RX ADMIN — LEVOTHYROXINE SODIUM 88 MCG: 88 TABLET ORAL at 12:10

## 2018-01-03 RX ADMIN — Medication 10 ML: at 14:08

## 2018-01-03 RX ADMIN — INSULIN LISPRO 3 UNITS: 100 INJECTION, SOLUTION INTRAVENOUS; SUBCUTANEOUS at 21:08

## 2018-01-03 RX ADMIN — INSULIN LISPRO 2 UNITS: 100 INJECTION, SOLUTION INTRAVENOUS; SUBCUTANEOUS at 17:44

## 2018-01-03 RX ADMIN — METOPROLOL SUCCINATE 50 MG: 50 TABLET, EXTENDED RELEASE ORAL at 10:12

## 2018-01-03 RX ADMIN — METOPROLOL TARTRATE 5 MG: 5 INJECTION, SOLUTION INTRAVENOUS at 03:06

## 2018-01-03 RX ADMIN — POTASSIUM CHLORIDE 10 MEQ: 750 TABLET, FILM COATED, EXTENDED RELEASE ORAL at 17:46

## 2018-01-03 RX ADMIN — FUROSEMIDE 80 MG: 80 TABLET ORAL at 12:07

## 2018-01-03 RX ADMIN — INSULIN LISPRO 2 UNITS: 100 INJECTION, SOLUTION INTRAVENOUS; SUBCUTANEOUS at 09:26

## 2018-01-03 RX ADMIN — POTASSIUM CHLORIDE 10 MEQ: 750 TABLET, FILM COATED, EXTENDED RELEASE ORAL at 21:08

## 2018-01-03 RX ADMIN — HEPARIN SODIUM 5000 UNITS: 5000 INJECTION, SOLUTION INTRAVENOUS; SUBCUTANEOUS at 03:12

## 2018-01-03 RX ADMIN — ATORVASTATIN CALCIUM 40 MG: 40 TABLET, FILM COATED ORAL at 10:10

## 2018-01-03 RX ADMIN — LORAZEPAM 0.5 MG: 2 INJECTION INTRAMUSCULAR; INTRAVENOUS at 14:04

## 2018-01-03 RX ADMIN — INSULIN GLARGINE 10 UNITS: 100 INJECTION, SOLUTION SUBCUTANEOUS at 10:09

## 2018-01-03 RX ADMIN — FAMOTIDINE 20 MG: 20 TABLET, FILM COATED ORAL at 12:10

## 2018-01-03 RX ADMIN — POTASSIUM CHLORIDE 10 MEQ: 750 TABLET, FILM COATED, EXTENDED RELEASE ORAL at 10:18

## 2018-01-03 RX ADMIN — CEFTRIAXONE SODIUM 1 G: 1 INJECTION, POWDER, FOR SOLUTION INTRAMUSCULAR; INTRAVENOUS at 12:04

## 2018-01-03 RX ADMIN — AMLODIPINE BESYLATE 5 MG: 5 TABLET ORAL at 10:17

## 2018-01-03 NOTE — PROGRESS NOTES
Nutrition Assessment:    INTERVENTIONS/RECOMMENDATIONS:   Continue current diet  Glucerna TID    ASSESSMENT:   Chart reviewed. Pt sleeping during visit. RN reports of fair PO intake with meals but she does enjoy Glucerna shakes. Will increase to TID. Diet Order: Regular  % Eaten:  No data found. Pertinent Medications: [x]Reviewed: pepcid, lasix, lantus, humalog, KCl,   Pertinent Labs: [x]Reviewed: K+ 3.4,   Food Allergies: [x]NKFA  []Other   Last BM:  1/1/2018  Edema:      []RUE   []LUE   []RLE   []LLE      Pressure Ulcer:      [] Stage I   [] Stage II   [] Stage III   [] Stage IV      Anthropometrics: Height: 5' 1\" (154.9 cm) Weight: 62.2 kg (137 lb 1 oz)    IBW (%IBW):   ( ) UBW (%UBW):   (  %)    BMI: Body mass index is 25.9 kg/(m^2). This BMI is indicative of:  []Underweight   []Normal   [x]Overweight   [] Obesity   [] Extreme Obesity (BMI>40)  Last Weight Metrics:  Weight Loss Metrics 1/3/2018 12/12/2017 7/31/2017 3/31/2016 5/30/2012 12/2/2010 8/23/2010   Today's Wt 137 lb 1 oz 150 lb 157 lb 6.5 oz 151 lb 151 lb 126 lb 8 oz 115 lb 8.3 oz   BMI 25.9 kg/m2 28.34 kg/m2 29.74 kg/m2 29.49 kg/m2 29.49 kg/m2 25.54 kg/m2 23.32 kg/m2       Estimated Nutrition Needs (Based on): 1150 Kcals/day (BMR: 960 x 1.2) , 60 g (1 g/kg) Protein  Carbohydrate: At Least 130 g/day  Fluids: 1150 mL/day or per primary team    NUTRITION DIAGNOSES:   Problem:  Inadequate protein-energy intake      Etiology: related to dementia     Signs/Symptoms: as evidenced by 9% weight loss x 1 month      NUTRITION INTERVENTIONS:  Meals/Snacks: General/healthful diet   Supplements: Commercial supplement              GOAL:   consume >50% of meals and ONS in 2-4 days    NUTRITION MONITORING AND EVALUATION      Food/Nutrient Intake Outcomes:  Total energy intake  Physical Signs/Symptoms Outcomes: Weight/weight change, Electrolyte and renal profile, Glucose profile, GI    Previous Goal Met:   [] Met              [x] Progressing Towards Goal [] Not Progressing Towards Goal   Previous Recommendations:   [x] Implemented          [] Not Implemented          [] Not Applicable    LEARNING NEEDS (Diet, Food/Nutrient-Drug Interaction):    [x] None Identified   [] Identified and Education Provided/Documented   [] Identified and Pt declined/was not appropriate     Cultural, Advent, OR Ethnic Dietary Needs:    [x] None Identified   [] Identified and Addressed     [x] Interdisciplinary Care Plan Reviewed/Documented    [x] Discharge Planning: General healthy diet with kcal and protein dense foods, 2-3 ONS per day   [] Participated in Interdisciplinary Rounds    NUTRITION RISK:    [x] High      to        [x] Moderate           []  Low  []  Minimal/Uncompromised      Cuba Bertrand RDN  Pager [phone]  Weekend Pager [phone]

## 2018-01-03 NOTE — PROGRESS NOTES
Problem: Dysphagia (Adult)  Goal: *Acute Goals and Plan of Care (Insert Text)  Speech pathology goals initiated 1/1/2018   1. Patient will tolerate a puree diet/ thin liquids free of s/s aspiration within 7 days. Changed to dys 2/thins  2. Patient will demonstrate timely and complete mastication of solid trials with slp within 7 days   Speech language pathology dysphagia treatment  Patient: Gila Zhou (50 y.o. female)  Date: 1/3/2018  Diagnosis: Acute respiratory failure ([address] Acute respiratory failure ([address]       Precautions:      ASSESSMENT: on room air  Pt seen today for swallowing therapy. Her oral phase was very slow even with canned pears. Lengthy but complete mastication. Slow posterior propulsion noted. Good oral clearance was noted. Pharyngeal swallow was grossly wnl but she had increased WOB after thins especially. Concern for increased RR /WOB after thins. No choking or coughing but may have some difficulty and must rest when eating. Go slowly with po. Her altered mental status and respiratory status put her at increased risk to aspirate. She is confused and not very communicative. Progression toward goals:  []         Improving appropriately and progressing toward goals  [x]         Improving slowly and progressing toward goals  []         Not making progress toward goals and plan of care will be adjusted     PLAN:  Recommendations and Planned Interventions:  Downgrade diet to dys 2/minced. Patient continues to benefit from skilled intervention to address the above impairments. Continue treatment per established plan of care. Discharge Recommendations:  None     SUBJECTIVE:   Patient waved good bye to me.  .    OBJECTIVE:   Cognitive and Communication Status:  Neurologic State: Agitated, Confused  Orientation Level: Disoriented to situation, Disoriented to place, Appropriate for age  Cognition: Decreased command following, Impaired decision making  Perception: Appears intact  Perseveration: No perseveration noted  Safety/Judgement: Not assessed  Dysphagia Treatment:  Oral Assessment:     P.O. Trials:  Patient Position: upright in bed  Vocal quality prior to P.O.: No impairment  Consistency Presented: Thin liquid;Mechanical soft  How Presented: Self-fed/presented;Cup/sip     Bolus Acceptance: No impairment  Bolus Formation/Control: Impaired  Type of Impairment: Delayed;Mastication  Propulsion: Delayed (# of seconds)  Oral Residue: None  Initiation of Swallow: Delayed (# of seconds)  Laryngeal Elevation: Functional  Aspiration Signs/Symptoms: None  Pharyngeal Phase Characteristics: No impairment, issues, or problems              Oral Phase Severity: Mild-moderate  Pharyngeal Phase Severity : No impairment                Pain:  Pain Scale 1: Numeric (0 - 10)  Pain Intensity 1: 0     After treatment:   []              Patient left in no apparent distress sitting up in chair  [x]              Patient left in no apparent distress in bed  [x]              Call bell left within reach  [x]              Nursing notified  [x]              Caregiver present  []              Bed alarm activated    Educated nsg to assist pt with small sips and resting when she has increased WOB after po. The patients plan of care including recommendations, planned interventions, and recommended diet changes were discussed with: Registered Nurse. []              Posted safety precautions in patient's room.     KIM Gomez  Time Calculation: 15 mins

## 2018-01-03 NOTE — PROGRESS NOTES
Bedside shift change report given to Gloria Marquez (oncoming nurse) by ΣΑΡΑΝΤΙ (offgoing nurse). Report included the following information SBAR, Kardex, Intake/Output, MAR and Recent Results. SHIFT SUMMARY:            [address] NURSING NOTE   Admission Date 12/29/2017   Admission Diagnosis Acute respiratory failure ([address]   Consults None      Cardiac Monitoring [x] Yes [] No      Purposeful Hourly Rounding [x] Yes    Billie Score Total Score: 4   Billie score 3 or > [x] Bed Alarm [x] Avasys [] 1:1 sitter [] Patient refused (Place signed refusal form in chart)   Lewis Score Lewis Score: 15   Lewis score 14 or < [] PMT consult [] Wound Care consult    []  Specialty bed  [] Nutrition consult      Influenza Vaccine Received Flu Vaccine for Current Season (usually Sept-March): Unsure    Patient/Guardian Refused (Notify MD): Yes (called Elise MACKENZIE - refused)      Oxygen needs?  [x] Room air Oxygen @  []1L    []2L    []3L   []4L    []5L   []6L     Use home O2? [] Yes [x] No  Perform O2 challenge test using  smartphrase (.Homeoxygen)      Last bowel movement Last Bowel Movement Date: 01/01/18      Urinary Catheter       External Female Catheter 12/30/17-Urine Output (mL): 500 ml     LDAs               Peripheral IV 01/01/18 Left Arm (Active)   Site Assessment Clean, dry, & intact 1/2/2018  7:35 PM   Phlebitis Assessment 0 1/2/2018  7:35 PM   Infiltration Assessment 0 1/2/2018  7:35 PM   Dressing Status Clean, dry, & intact 1/2/2018  7:35 PM   Dressing Type Transparent 1/2/2018  7:35 PM   Hub Color/Line Status Yellow;Capped 1/2/2018  7:35 PM          External Female Catheter 12/30/17 (Active)   Site Assessment Clean, dry, & intact 12/31/2017 11:11 PM   Repositioned Yes 12/31/2017 11:11 PM   Perineal Care Yes 12/31/2017 11:11 PM   Wick Changed Yes 12/31/2017 11:11 PM   Suction Canister/Tubing Changed Yes 12/31/2017 11:11 PM   Urine Output (mL) 500 ml 12/31/2017 11:11 PM                   Readmission Risk Assessment Tool Score High Risk            41       Total Score        3 Has Seen PCP in Last 6 Months (Yes=3, No=0)    5 Pt. Coverage (Medicare=5 , Medicaid, or Self-Pay=4)    33 Charlson Comorbidity Score (Age + Comorbid Conditions)        Criteria that do not apply:    . Living with Significant Other. Assisted Living. LTAC. SNF.  or   Rehab    Patient Length of Stay (>5 days = 3)    IP Visits Last 12 Months (1-3=4, 4=9, >4=11)       Expected Length of Stay 3d 16h   Actual Length of Stay 4

## 2018-01-03 NOTE — PROGRESS NOTES
PROGRESS NOTE    NAME:  Ana Meter   :   10/31/1924   MRN:   [id number]     Date/Time:  1/3/2018 7:18 AM  Subjective:   History:  Sleeping soundly. did not require ativan last night.        Medications reviewed:  Current Facility-Administered Medications   Medication Dose Route Frequency    metoprolol succinate (TOPROL-XL) XL tablet 50 mg  50 mg Oral DAILY    potassium chloride SR (KLOR-CON 10) tablet 10 mEq  10 mEq Oral TID    furosemide (LASIX) tablet 80 mg  80 mg Oral DAILY    insulin glargine (LANTUS) injection 10 Units  10 Units SubCUTAneous DAILY    cefTRIAXone (ROCEPHIN) 1 g in 0.9% sodium chloride (MBP/ADV) 50 mL  1 g IntraVENous Q24H    albuterol-ipratropium (DUO-NEB) 2.5 MG-0.5 MG/3 ML  3 mL Nebulization Q6H PRN    LORazepam (ATIVAN) injection 0.5 mg  0.5 mg IntraVENous QHS PRN    glucose chewable tablet 16 g  4 Tab Oral PRN    dextrose (D50W) injection syrg 12.5-25 g  12.5-25 g IntraVENous PRN    glucagon (GLUCAGEN) injection 1 mg  1 mg IntraMUSCular PRN    insulin lispro (HUMALOG) injection   SubCUTAneous AC&HS    sodium chloride (NS) flush 5-10 mL  5-10 mL IntraVENous Q8H    acetaminophen (TYLENOL) tablet 650 mg  650 mg Oral Q6H PRN    allopurinol (ZYLOPRIM) tablet 100 mg  100 mg Oral DAILY    amLODIPine (NORVASC) tablet 5 mg  5 mg Oral DAILY    atorvastatin (LIPITOR) tablet 40 mg  40 mg Oral DAILY    levothyroxine (SYNTHROID) tablet 88 mcg  88 mcg Oral ACB    famotidine (PEPCID) tablet 20 mg  20 mg Oral DAILY    sodium chloride (NS) flush 5-10 mL  5-10 mL IntraVENous PRN        Objective:   Vitals:  Visit Vitals    /54 (BP 1 Location: Right arm, BP Patient Position: At rest)    Pulse 70    Temp 97.6 °F (36.4 °C)    Resp 18    Ht 5' 1\" (1.549 m)    Wt 137 lb 1 oz (62.2 kg)    SpO2 90%    BMI 25.9 kg/m2    O2 Flow Rate (L/min): 2 l/min O2 Device: Room air Temp (24hrs), Av.7 °F (36.5 °C), Min:97.4 °F (36.3 °C), Max:98.1 °F (36.7 °C)      Last 24hr Input/Output:    Intake/Output Summary (Last 24 hours) at 01/03/18 0718  Last data filed at 01/03/18 0319   Gross per 24 hour   Intake              240 ml   Output                0 ml   Net              240 ml        PHYSICAL EXAM:  General:sleeping but arousable, cooperative, no distress, appears stated age. Head:    Normocephalic, without obvious abnormality, atraumatic. Eyes:    Conjunctivae/corneas clear. PERRLA  Nose:   Nares normal. No drainage or sinus tenderness. Throat:     Lips, mucosa, and tongue normal.  No Thrush  Neck:   Supple, symmetrical,  no adenopathy, thyroid: non tender     no carotid bruit and no JVD. Back:     Symmetric,  No CVA tenderness. Lungs:    Clear to auscultation bilaterally. No Wheezing or Rhonchi. No rales. Heart:    Regular rate and rhythm,  no murmur, rub or gallop. Abdomen:    Soft, non-tender. Not distended. Bowel sounds normal. No masses  Extremities:  Extremities normal, atraumatic, No cyanosis. No edema. No clubbing  Lymph nodes:  Cervical, supraclavicular normal.  Neurologic:  Normal strength, Alert and oriented X 0.    Skin:                No rash      Lab Data Reviewed:    Recent Results (from the past 24 hour(s))   GLUCOSE, POC    Collection Time: 01/02/18 11:10 AM   Result Value Ref Range    Glucose (POC) 203 (H) 65 - 100 mg/dL    Performed by LESLYE MORENO(CON)    GLUCOSE, POC    Collection Time: 01/02/18  4:39 PM   Result Value Ref Range    Glucose (POC) 218 (H) 65 - 100 mg/dL    Performed by LESLYE MORENO(CON)    GLUCOSE, POC    Collection Time: 01/02/18  8:44 PM   Result Value Ref Range    Glucose (POC) 160 (H) 65 - 100 mg/dL    Performed by Meri Mason (PCT)    CBC W/O DIFF    Collection Time: 01/03/18  1:59 AM   Result Value Ref Range    WBC 9.9 3.6 - 11.0 K/uL    RBC 3.28 (L) 3.80 - 5.20 M/uL    HGB 9.7 (L) 11.5 - 16.0 g/dL    HCT 31.4 (L) 35.0 - 47.0 %    MCV 95.7 80.0 - 99.0 FL    MCH 29.6 26.0 - 34.0 PG    MCHC 30.9 30.0 - 36.5 g/dL    RDW 16.1 (H) 11.5 - 14.5 %    PLATELET 995 195 - 771 K/uL   METABOLIC PANEL, BASIC    Collection Time: 01/03/18  1:59 AM   Result Value Ref Range    Sodium 143 136 - 145 mmol/L    Potassium 3.4 (L) 3.5 - 5.1 mmol/L    Chloride 111 (H) 97 - 108 mmol/L    CO2 24 21 - 32 mmol/L    Anion gap 8 5 - 15 mmol/L    Glucose 148 (H) 65 - 100 mg/dL    BUN 41 (H) 6 - 20 MG/DL    Creatinine 1.58 (H) 0.55 - 1.02 MG/DL    BUN/Creatinine ratio 26 (H) 12 - 20      GFR est AA 37 (L) >60 ml/min/1.73m2    GFR est non-AA 31 (L) >60 ml/min/1.73m2    Calcium 8.2 (L) 8.5 - 10.1 MG/DL         Assessment/Plan:     Principal Problem:    Acute respiratory failure (HCC) (12/29/2017)    Active Problems:    DM (diabetes mellitus), type 2, uncontrolled, with renal complications ([facility]) (8/96/9731)      CKD (chronic kidney disease) stage 3, GFR 30-59 ml/min (8/23/2010)      Aspiration pneumonia ([facility]) (12/30/2017)      Lactic acidosis (12/30/2017)      Dementia (12/30/2017)       ___________________________________________________  PLAN:    1. Recheck CXR   2. Should be able to return to [facility] tomorrow.   3.  :  4.            ___________________________________________________    Attending Physician: Sariah Solorzano MD

## 2018-01-03 NOTE — PROGRESS NOTES
CM sent message to [facility] via [employer] to update for potential pt discharge tomorrow. If pt has a sitter, pt will need to be 24 hours sitter free prior to discharge back to [facility].     BRUCE Moore Supervisee in Social Work, [address] [place] [address]  [phone]

## 2018-01-03 NOTE — PROGRESS NOTES
Pt is a 79 yo  female admitted on 12/29/17 for acute respiratory failure. Pt is a LTAC resident at [facility]. Pt has lived at [facility] for 16 years (transitioned from [address] [place] [address] to [place] to [place]). Pt has baseline dementia. Pt ambulates with a RW. Pt's mPOA (Kody Tucker) and fPOA [place] Soto) provide transportation for pt to appointments, however her PCP also comes to [facility] for appointments. [facility] CM met with pt to verify demographic info and complete initial assessment, dc planning. Pt's mPOA is bedside at this time. Pt did not communicate, but maintained eye contact with CM. Pt's mPOA provided information needed for assessment. Per chart review, pt is likely to discharge home to [facility] on Thursday. Care Management Interventions  PCP Verified by CM: Yes  Palliative Care Criteria Met (RRAT>21 & CHF Dx)?: No  Mode of Transport at Discharge: Other (see comment) ([facility] vs by private vehicle)  Transition of Care Consult (CM Consult): [address], Discharge Planning ([facility])  Discharge Durable Medical Equipment: No (RW)  Health Maintenance Reviewed: Yes  Physical Therapy Consult: No  Occupational Therapy Consult: No  Speech Therapy Consult: Yes  Current Support Network: Family Lives Nearby, [address] [place] [address] (Resident at [address] [place] [address];  Lived at [facility] for 16 years, transitioned from [address] [place] [address] to [place] to [employer])  Confirm Follow Up Transport: Family  Plan discussed with Pt/Family/Caregiver: Yes  Freedom of Choice Offered: Yes  Discharge Location  Discharge Placement: [facility] (LTAC) ([facility])    BRUCE Davis Supervisee in Social Work, [address] [place] [address]  [phone]

## 2018-01-03 NOTE — PROGRESS NOTES
Bedside shift change report given to Jere Kenney RN (oncoming nurse) by ΣΑΡΑΝΤISIS KIRKPATRICK (offgoing nurse). Report included the following information SBAR. 1030 Pt is becoming increasingly agitated and wanting to get out of bed.    1235 Pt is irritated and trying get out of bed. Now she is desatting to 66's and refusing to put on O2. Dr. Cassia Hutchinson was paged. 1400 Pt refused xray. Bedside shift change report given to [address] (oncoming nurse) by Jere Kenney RN (offgoing nurse). Report included the following information SBAR. SHIFT SUMMARY:            [address] NURSING NOTE   Admission Date 12/29/2017   Admission Diagnosis Acute respiratory failure ([facility]   Consults None      Cardiac Monitoring [x] Yes [] No      Purposeful Hourly Rounding [x] Yes    Billie Score Total Score: 4   Billie score 3 or > [x] Bed Alarm [] Avasys [] 1:1 sitter [] Patient refused (Place signed refusal form in chart)   Lewis Score Lewis Score: 15   Lewis score 14 or < [] PMT consult [] Wound Care consult    []  Specialty bed  [] Nutrition consult      Influenza Vaccine Received Flu Vaccine for Current Season (usually Sept-March): Unsure    Patient/Guardian Refused (Notify MD): Yes (called [facility] - refused)      Oxygen needs?  [x] Room air Oxygen @  []1L    []2L    []3L   []4L    []5L   []6L     Use home O2? [] Yes [x] No  Perform O2 challenge test using  smartphrase (.Homeoxygen)      Last bowel movement Last Bowel Movement Date: 01/01/18      Urinary Catheter       External Female Catheter 12/30/17-Urine Output (mL): 500 ml     LDAs               Peripheral IV 01/01/18 Left Arm (Active)   Site Assessment Clean, dry, & intact 1/3/2018  4:00 PM   Phlebitis Assessment 0 1/3/2018  4:00 PM   Infiltration Assessment 0 1/3/2018  4:00 PM   Dressing Status Clean, dry, & intact 1/3/2018  4:00 PM   Dressing Type Tape;Transparent 1/3/2018  4:00 PM   Hub Color/Line Status Yellow 1/3/2018  4:00 PM          External Female Catheter 12/30/17 (Active)   Site Assessment Clean, dry, & intact 12/31/2017 11:11 PM   Repositioned Yes 12/31/2017 11:11 PM   Perineal Care Yes 12/31/2017 11:11 PM   Wick Changed Yes 12/31/2017 11:11 PM   Suction Canister/Tubing Changed Yes 12/31/2017 11:11 PM   Urine Output (mL) 500 ml 12/31/2017 11:11 PM                   Readmission Risk Assessment Tool Score High Risk            44       Total Score        3 Has Seen PCP in Last 6 Months (Yes=3, No=0)    3 Patient Length of Stay (>5 days = 3)    5 Pt. Coverage (Medicare=5 , Medicaid, or Self-Pay=4)    33 Charlson Comorbidity Score (Age + Comorbid Conditions)        Criteria that do not apply:    . Living with Significant Other. Assisted Living. LTAC. SNF.  or   Rehab    IP Visits Last 12 Months (1-3=4, 4=9, >4=11)       Expected Length of Stay 3d 16h   Actual Length of Stay 5

## 2018-01-04 LAB
ANION GAP SERPL CALC-SCNC: 9 MMOL/L (ref 5–15)
BACTERIA SPEC CULT: NORMAL
BACTERIA SPEC CULT: NORMAL
BUN SERPL-MCNC: 39 MG/DL (ref 6–20)
BUN/CREAT SERPL: 24 (ref 12–20)
CALCIUM SERPL-MCNC: 8.4 MG/DL (ref 8.5–10.1)
CHLORIDE SERPL-SCNC: 110 MMOL/L (ref 97–108)
CO2 SERPL-SCNC: 25 MMOL/L (ref 21–32)
CREAT SERPL-MCNC: 1.6 MG/DL (ref 0.55–1.02)
GLUCOSE BLD STRIP.AUTO-MCNC: 122 MG/DL (ref 65–100)
GLUCOSE BLD STRIP.AUTO-MCNC: 174 MG/DL (ref 65–100)
GLUCOSE BLD STRIP.AUTO-MCNC: 200 MG/DL (ref 65–100)
GLUCOSE BLD STRIP.AUTO-MCNC: 208 MG/DL (ref 65–100)
GLUCOSE SERPL-MCNC: 242 MG/DL (ref 65–100)
POTASSIUM SERPL-SCNC: 3.9 MMOL/L (ref 3.5–5.1)
SERVICE CMNT-IMP: ABNORMAL
SERVICE CMNT-IMP: NORMAL
SERVICE CMNT-IMP: NORMAL
SODIUM SERPL-SCNC: 144 MMOL/L (ref 136–145)

## 2018-01-04 PROCEDURE — 74011636637 HC RX REV CODE- 636/637: Performed by: INTERNAL MEDICINE

## 2018-01-04 PROCEDURE — 74011250637 HC RX REV CODE- 250/637: Performed by: INTERNAL MEDICINE

## 2018-01-04 PROCEDURE — 82962 GLUCOSE BLOOD TEST: CPT

## 2018-01-04 PROCEDURE — 74011000250 HC RX REV CODE- 250: Performed by: INTERNAL MEDICINE

## 2018-01-04 PROCEDURE — 65660000000 HC RM CCU STEPDOWN

## 2018-01-04 PROCEDURE — 36415 COLL VENOUS BLD VENIPUNCTURE: CPT | Performed by: INTERNAL MEDICINE

## 2018-01-04 PROCEDURE — 92526 ORAL FUNCTION THERAPY: CPT

## 2018-01-04 PROCEDURE — 74011250636 HC RX REV CODE- 250/636: Performed by: INTERNAL MEDICINE

## 2018-01-04 PROCEDURE — 80048 BASIC METABOLIC PNL TOTAL CA: CPT | Performed by: INTERNAL MEDICINE

## 2018-01-04 RX ADMIN — FAMOTIDINE 20 MG: 20 TABLET, FILM COATED ORAL at 09:21

## 2018-01-04 RX ADMIN — INSULIN GLARGINE 10 UNITS: 100 INJECTION, SOLUTION SUBCUTANEOUS at 09:22

## 2018-01-04 RX ADMIN — INSULIN LISPRO 2 UNITS: 100 INJECTION, SOLUTION INTRAVENOUS; SUBCUTANEOUS at 21:05

## 2018-01-04 RX ADMIN — INSULIN LISPRO 3 UNITS: 100 INJECTION, SOLUTION INTRAVENOUS; SUBCUTANEOUS at 09:24

## 2018-01-04 RX ADMIN — LEVOTHYROXINE SODIUM 88 MCG: 88 TABLET ORAL at 09:22

## 2018-01-04 RX ADMIN — CEFTRIAXONE SODIUM 1 G: 1 INJECTION, POWDER, FOR SOLUTION INTRAMUSCULAR; INTRAVENOUS at 10:40

## 2018-01-04 RX ADMIN — ALLOPURINOL 100 MG: 100 TABLET ORAL at 09:20

## 2018-01-04 RX ADMIN — Medication 10 ML: at 21:05

## 2018-01-04 RX ADMIN — FUROSEMIDE 80 MG: 80 TABLET ORAL at 09:21

## 2018-01-04 RX ADMIN — POTASSIUM CHLORIDE 10 MEQ: 750 TABLET, FILM COATED, EXTENDED RELEASE ORAL at 18:07

## 2018-01-04 RX ADMIN — IPRATROPIUM BROMIDE AND ALBUTEROL SULFATE 3 ML: .5; 3 SOLUTION RESPIRATORY (INHALATION) at 14:55

## 2018-01-04 RX ADMIN — ATORVASTATIN CALCIUM 40 MG: 40 TABLET, FILM COATED ORAL at 09:21

## 2018-01-04 RX ADMIN — Medication 10 ML: at 02:00

## 2018-01-04 RX ADMIN — AMLODIPINE BESYLATE 5 MG: 5 TABLET ORAL at 09:20

## 2018-01-04 RX ADMIN — INSULIN LISPRO 2 UNITS: 100 INJECTION, SOLUTION INTRAVENOUS; SUBCUTANEOUS at 12:30

## 2018-01-04 RX ADMIN — POTASSIUM CHLORIDE 10 MEQ: 750 TABLET, FILM COATED, EXTENDED RELEASE ORAL at 09:22

## 2018-01-04 RX ADMIN — METOPROLOL SUCCINATE 50 MG: 50 TABLET, EXTENDED RELEASE ORAL at 09:22

## 2018-01-04 RX ADMIN — Medication 10 ML: at 17:15

## 2018-01-04 NOTE — DIABETES MGMT
DTC Progress Note    Recommendations/ Comments: If appropriate, please consider titrating Lantus until fasting BG <150 mg/dl and consider adding low dose prandial insulin consistent with home dosing. Chart reviewed on [facility]. Patient is a 80 y.o. female with known DM on NPH 16 units bid and Lispro 4 units ac tid. A1c:   Lab Results   Component Value Date/Time    Hemoglobin A1c 7.2 12/30/2017 02:06 PM    Hemoglobin A1c 10.4 08/24/2010 02:55 AM       Recent Glucose Results:   Lab Results   Component Value Date/Time     (H) 01/04/2018 01:52 AM    GLUCPOC 174 (H) 01/04/2018 11:42 AM    GLUCPOC 200 (H) 01/04/2018 08:36 AM    GLUCPOC 256 (H) 01/03/2018 08:59 PM        Lab Results   Component Value Date/Time    Creatinine 1.60 01/04/2018 01:52 AM     Estimated Creatinine Clearance: 18.4 mL/min (based on Cr of 1.6). Active Orders   Diet    DIET DYSPHAGIA MECH ALTERED (NDD2)        PO intake: No data found. Current hospital DM medication: Lantus 10 units, Lispro correctional insulin - normal sensitivity ac and hs. Will continue to follow as needed.     Thank you  Criselda Stroud RD CDE

## 2018-01-04 NOTE — PROGRESS NOTES
Bedside shift change report given to  (oncoming nurse) by Yuly Jacobs (offgoing nurse). Report included the following information SBAR, Kardex, Intake/Output, MAR and Recent Results. SHIFT SUMMARY:            [facility] NURSING NOTE   Admission Date 12/29/2017   Admission Diagnosis Acute respiratory failure ([address]   Consults None      Cardiac Monitoring [x] Yes [] No      Purposeful Hourly Rounding [x] Yes    Billie Score Total Score: 4   Billie score 3 or > [x] Bed Alarm [x] Avasys [] 1:1 sitter [] Patient refused (Place signed refusal form in chart)   Lewis Score Lewis Score: 16   Lewis score 14 or < [] PMT consult [] Wound Care consult    []  Specialty bed  [] Nutrition consult      Influenza Vaccine Received Flu Vaccine for Current Season (usually Sept-March): Unsure    Patient/Guardian Refused (Notify MD): Yes (called Carmencita Chung [facility] - refused)      Oxygen needs?  [x] Room air Oxygen @  []1L    []2L    []3L   []4L    []5L   []6L     Use home O2? [] Yes [x] No  Perform O2 challenge test using  smartphrase (.Homeoxygen)      Last bowel movement Last Bowel Movement Date: 01/01/18      Urinary Catheter       External Female Catheter 12/30/17-Urine Output (mL): 500 ml     LDAs               Peripheral IV 01/01/18 Left Arm (Active)   Site Assessment Clean, dry, & intact 1/3/2018  7:13 PM   Phlebitis Assessment 0 1/3/2018  7:13 PM   Infiltration Assessment 0 1/3/2018  7:13 PM   Dressing Status Clean, dry, & intact 1/3/2018  7:13 PM   Dressing Type Transparent 1/3/2018  7:13 PM   Hub Color/Line Status Yellow;Capped 1/3/2018  7:13 PM          External Female Catheter 12/30/17 (Active)   Site Assessment Clean, dry, & intact 12/31/2017 11:11 PM   Repositioned Yes 12/31/2017 11:11 PM   Perineal Care Yes 12/31/2017 11:11 PM   Wick Changed Yes 12/31/2017 11:11 PM   Suction Canister/Tubing Changed Yes 12/31/2017 11:11 PM   Urine Output (mL) 500 ml 12/31/2017 11:11 PM                   Readmission Risk Assessment Tool Score High Risk            44       Total Score        3 Has Seen PCP in Last 6 Months (Yes=3, No=0)    3 Patient Length of Stay (>5 days = 3)    5 Pt. Coverage (Medicare=5 , Medicaid, or Self-Pay=4)    33 Charlson Comorbidity Score (Age + Comorbid Conditions)        Criteria that do not apply:    . Living with Significant Other. Assisted Living. LTAC. SNF.  or   Rehab    IP Visits Last 12 Months (1-3=4, 4=9, >4=11)       Expected Length of Stay 3d 16h   Actual Length of Stay 5

## 2018-01-04 NOTE — PROGRESS NOTES
Problem: Dysphagia (Adult)  Goal: *Acute Goals and Plan of Care (Insert Text)  Speech pathology goals initiated 1/1/2018   1. Patient will tolerate a puree diet/ thin liquids free of s/s aspiration within 7 days. Changed to dys 2/thins  2. Patient will demonstrate timely and complete mastication of solid trials with slp within 7 days    Speech language pathology dysphagia treatment  Patient: Mary Cason (85 y.o. female)  Date: 1/4/2018  Diagnosis: Acute respiratory failure ([address] Acute respiratory failure ([address]       Precautions:aspiration      ASSESSMENT:  Patient remains on regular diet; however, SLP recommended downgrade to dysphagia 2 (mechanically altered yesterday). Observed patient with crackers, toast and scrambled eggs from breakfast tray along with meds crushed in applesauce. Patient with decreased bolus acceptance, pursing lips to limit bolus size (allowing scant amount in at once), prolonged and effortful mastication of solid with suspected premature spillage due to decreased bolus control while coordinating breathing/chewing. Pharyngeal swallow initiation is suspected to be mildly delayed but overall hyolaryngeal elevation/excursion functional given advanced age. Continue to recommend downgrade to dysphagia 2 (mechanically altered). Patient clearly unable to tolerate regular consistency as observed with breakfast tray. Progression toward goals:  []         Improving appropriately and progressing toward goals  [x]         Improving slowly and progressing toward goals  []         Not making progress toward goals and plan of care will be adjusted     PLAN:  Recommendations and Planned Interventions:  Dysphagia 2 (mechanically altered)/ thin liquids. Straws ok  Would NOT advance to regular solids at this time  Patient drinks very well and will likely gain more nutrition via liquid supplements.   meds 1 at a time whole in applesauce  1:1 assist with meals, SLOW rate, stop with fatigue     Patient continues to benefit from skilled intervention to address the above impairments. Continue treatment per established plan of care. Discharge Recommendations:  Skilled Nursing Facility     SUBJECTIVE:   Patient stated I don't like it . (when RN attempting to give meds)    OBJECTIVE:   Cognitive and Communication Status:  Neurologic State: Alert, Confused  Orientation Level: Unable to verbalize  Cognition: Appropriate decision making  Perception: Appears intact  Perseveration: No perseveration noted  Safety/Judgement: Not assessed  Dysphagia Treatment:  Oral Assessment:  Oral Assessment  Labial: No impairment  Dentition: Natural;Poor  Oral Hygiene:  (dry)  Lingual: No impairment  Velum: Unable to visualize  Mandible: No impairment  P.O. Trials:  Patient Position:  (upright in bed)  Vocal quality prior to P.O.: No impairment  Consistency Presented:  Thin liquid  How Presented: Successive swallows;Straw;Self-fed/presented;Cup/sip;Spoon     Bolus Acceptance: No impairment  Bolus Formation/Control: Impaired  Type of Impairment: Delayed;Mastication  Propulsion: Delayed (# of seconds)  Oral Residue: 10-50% of bolus  Initiation of Swallow: Delayed (# of seconds)  Laryngeal Elevation: Functional  Aspiration Signs/Symptoms:  (strong cough after toast)  Pharyngeal Phase Characteristics:  (suspect mild delay)             Oral Phase Severity: Mild-moderate  Pharyngeal Phase Severity : No impairment (functional for age)              Pain:  Pain Scale 1: Visual  Pain Intensity 1: 0     After treatment:   []              Patient left in no apparent distress sitting up in chair  [x]              Patient left in no apparent distress in bed  [x]              Call bell left within reach  [x]              Nursing notified  []              Caregiver present  []              Bed alarm activated    COMMUNICATION/EDUCATION:       The patients plan of care including recommendations, planned interventions, and recommended diet changes were discussed with: Registered Nurse. []              Posted safety precautions in patient's room.   Candice Kennedy M.S. CCC-SLP  Time Calculation: 21 mins

## 2018-01-04 NOTE — PROGRESS NOTES
PROGRESS NOTE    NAME:  José Antonio Ball   :   10/31/1924   MRN:   [id number]     Date/Time:  2018 7:32 AM  Subjective:   History:  Unable to do xray due to agitation & combativeness.       Medications reviewed:  Current Facility-Administered Medications   Medication Dose Route Frequency    metoprolol succinate (TOPROL-XL) XL tablet 50 mg  50 mg Oral DAILY    potassium chloride SR (KLOR-CON 10) tablet 10 mEq  10 mEq Oral TID    furosemide (LASIX) tablet 80 mg  80 mg Oral DAILY    cefTRIAXone (ROCEPHIN) 1 g/50 mL NS IVPB  1 g IntraVENous Q24H    LORazepam (ATIVAN) injection 0.5 mg  0.5 mg IntraVENous Q6H PRN    insulin glargine (LANTUS) injection 10 Units  10 Units SubCUTAneous DAILY    albuterol-ipratropium (DUO-NEB) 2.5 MG-0.5 MG/3 ML  3 mL Nebulization Q6H PRN    glucose chewable tablet 16 g  4 Tab Oral PRN    dextrose (D50W) injection syrg 12.5-25 g  12.5-25 g IntraVENous PRN    glucagon (GLUCAGEN) injection 1 mg  1 mg IntraMUSCular PRN    insulin lispro (HUMALOG) injection   SubCUTAneous AC&HS    sodium chloride (NS) flush 5-10 mL  5-10 mL IntraVENous Q8H    acetaminophen (TYLENOL) tablet 650 mg  650 mg Oral Q6H PRN    allopurinol (ZYLOPRIM) tablet 100 mg  100 mg Oral DAILY    amLODIPine (NORVASC) tablet 5 mg  5 mg Oral DAILY    atorvastatin (LIPITOR) tablet 40 mg  40 mg Oral DAILY    levothyroxine (SYNTHROID) tablet 88 mcg  88 mcg Oral ACB    famotidine (PEPCID) tablet 20 mg  20 mg Oral DAILY    sodium chloride (NS) flush 5-10 mL  5-10 mL IntraVENous PRN        Objective:   Vitals:  Visit Vitals    /65    Pulse 82    Temp 98.3 °F (36.8 °C)    Resp 20    Ht 5' 1\" (1.549 m)    Wt 134 lb 8 oz (61 kg)    SpO2 92%    BMI 25.41 kg/m2    O2 Flow Rate (L/min): 2 l/min O2 Device: Nasal cannula Temp (24hrs), Av.7 °F (36.5 °C), Min:97.3 °F (36.3 °C), Max:98.3 °F (36.8 °C)      Last 24hr Input/Output:    Intake/Output Summary (Last 24 hours) at 18 0732  Last data filed at 18 3147   Gross per 24 hour   Intake              240 ml   Output                0 ml   Net              240 ml        PHYSICAL EXAM:  General:    Sleeping-arousable-pleasantly confused. , cooperative, no distress, appears stated age. Head:    Normocephalic, without obvious abnormality, atraumatic. Eyes:    Conjunctivae/corneas clear. PERRLA  Nose:   Nares normal. No drainage or sinus tenderness. Throat:     Lips, mucosa, and tongue normal.  No Thrush  Neck:   Supple, symmetrical,  no adenopathy, thyroid: non tender     no carotid bruit and no JVD. Back:     Symmetric,  No CVA tenderness. Lungs:    Clear to auscultation bilaterally. No Wheezing or Rhonchi. No rales. Heart:    Regular rate and rhythm,  no murmur, rub or gallop. Abdomen:    Soft, non-tender. Not distended. Bowel sounds normal. No masses  Extremities:  Extremities normal, atraumatic, No cyanosis. No edema. No clubbing  Lymph nodes:  Cervical, supraclavicular normal.  Neurologic:  Normal strength, Alert and oriented X 0.    Skin:                No rash      Lab Data Reviewed:    Recent Results (from the past 24 hour(s))   GLUCOSE, POC    Collection Time: 01/03/18  7:48 AM   Result Value Ref Range    Glucose (POC) 160 (H) 65 - 100 mg/dL    Performed by Eran Longoria (PCT)    GLUCOSE, POC    Collection Time: 01/03/18 11:32 AM   Result Value Ref Range    Glucose (POC) 184 (H) 65 - 100 mg/dL    Performed by Fanny Ocampo (PCT)    GLUCOSE, POC    Collection Time: 01/03/18  4:07 PM   Result Value Ref Range    Glucose (POC) 198 (H) 65 - 100 mg/dL    Performed by Marcy Nj    GLUCOSE, POC    Collection Time: 01/03/18  8:59 PM   Result Value Ref Range    Glucose (POC) 256 (H) 65 - 100 mg/dL    Performed by Nidia Dugan    METABOLIC PANEL, BASIC    Collection Time: 01/04/18  1:52 AM   Result Value Ref Range    Sodium 144 136 - 145 mmol/L    Potassium 3.9 3.5 - 5.1 mmol/L    Chloride 110 (H) 97 - 108 mmol/L    CO2 25 21 - 32 mmol/L    Anion gap 9 5 - 15 mmol/L    Glucose 242 (H) 65 - 100 mg/dL    BUN 39 (H) 6 - 20 MG/DL    Creatinine 1.60 (H) 0.55 - 1.02 MG/DL    BUN/Creatinine ratio 24 (H) 12 - 20      GFR est AA 36 (L) >60 ml/min/1.73m2    GFR est non-AA 30 (L) >60 ml/min/1.73m2    Calcium 8.4 (L) 8.5 - 10.1 MG/DL         Assessment/Plan:     Principal Problem:    Acute respiratory failure (HCC) (12/29/2017)    Active Problems:    DM (diabetes mellitus), type 2, uncontrolled, with renal complications ([facility] (9/30/8041)      CKD (chronic kidney disease) stage 3, GFR 30-59 ml/min (8/23/2010)      Aspiration pneumonia ([facility] (12/30/2017)      Lactic acidosis (12/30/2017)      Dementia (12/30/2017)       ___________________________________________________  PLAN:    1.delay discharge due to inclement weather    2.   3.  :  4.            ___________________________________________________    Attending Physician: Gary Muñiz MD

## 2018-01-05 VITALS
SYSTOLIC BLOOD PRESSURE: 139 MMHG | BODY MASS INDEX: 25.3 KG/M2 | RESPIRATION RATE: 20 BRPM | TEMPERATURE: 97.6 F | HEART RATE: 85 BPM | HEIGHT: 61 IN | WEIGHT: 134 LBS | DIASTOLIC BLOOD PRESSURE: 114 MMHG | OXYGEN SATURATION: 93 %

## 2018-01-05 DIAGNOSIS — R45.1 AGITATION REQUIRING SEDATION PROTOCOL: Primary | ICD-10-CM

## 2018-01-05 LAB
GLUCOSE BLD STRIP.AUTO-MCNC: 166 MG/DL (ref 65–100)
SERVICE CMNT-IMP: ABNORMAL

## 2018-01-05 PROCEDURE — 74011636637 HC RX REV CODE- 636/637: Performed by: INTERNAL MEDICINE

## 2018-01-05 PROCEDURE — 82962 GLUCOSE BLOOD TEST: CPT

## 2018-01-05 PROCEDURE — 74011250637 HC RX REV CODE- 250/637: Performed by: INTERNAL MEDICINE

## 2018-01-05 PROCEDURE — 77010033678 HC OXYGEN DAILY

## 2018-01-05 RX ORDER — CEPHALEXIN 250 MG/1
500 CAPSULE ORAL 3 TIMES DAILY
Status: DISCONTINUED | OUTPATIENT
Start: 2018-01-05 | End: 2018-01-05 | Stop reason: HOSPADM

## 2018-01-05 RX ORDER — POTASSIUM CHLORIDE 750 MG/1
10 TABLET, FILM COATED, EXTENDED RELEASE ORAL 3 TIMES DAILY
Qty: 100 TAB | Refills: 5 | Status: SHIPPED | OUTPATIENT
Start: 2018-01-05

## 2018-01-05 RX ORDER — FUROSEMIDE 80 MG/1
160 TABLET ORAL DAILY
Qty: 100 TAB | Refills: 6 | Status: SHIPPED | OUTPATIENT
Start: 2018-01-05

## 2018-01-05 RX ORDER — CEPHALEXIN 500 MG/1
500 CAPSULE ORAL 3 TIMES DAILY
Qty: 15 CAP | Refills: 0 | Status: SHIPPED | OUTPATIENT
Start: 2018-01-05 | End: 2018-09-08

## 2018-01-05 RX ORDER — LORAZEPAM 2 MG/ML
1 CONCENTRATE ORAL
Qty: 100 ML | Refills: 3 | Status: SHIPPED | OUTPATIENT
Start: 2018-01-05

## 2018-01-05 RX ADMIN — Medication 10 ML: at 06:53

## 2018-01-05 RX ADMIN — INSULIN LISPRO 2 UNITS: 100 INJECTION, SOLUTION INTRAVENOUS; SUBCUTANEOUS at 09:41

## 2018-01-05 RX ADMIN — CEPHALEXIN 500 MG: 250 CAPSULE ORAL at 09:35

## 2018-01-05 RX ADMIN — ALLOPURINOL 100 MG: 100 TABLET ORAL at 09:35

## 2018-01-05 RX ADMIN — FAMOTIDINE 20 MG: 20 TABLET, FILM COATED ORAL at 09:35

## 2018-01-05 RX ADMIN — LEVOTHYROXINE SODIUM 88 MCG: 88 TABLET ORAL at 09:35

## 2018-01-05 RX ADMIN — AMLODIPINE BESYLATE 5 MG: 5 TABLET ORAL at 09:35

## 2018-01-05 RX ADMIN — METOPROLOL SUCCINATE 50 MG: 50 TABLET, EXTENDED RELEASE ORAL at 09:35

## 2018-01-05 RX ADMIN — POTASSIUM CHLORIDE 10 MEQ: 750 TABLET, FILM COATED, EXTENDED RELEASE ORAL at 09:35

## 2018-01-05 RX ADMIN — INSULIN GLARGINE 10 UNITS: 100 INJECTION, SOLUTION SUBCUTANEOUS at 09:41

## 2018-01-05 RX ADMIN — FUROSEMIDE 80 MG: 80 TABLET ORAL at 09:35

## 2018-01-05 NOTE — PROGRESS NOTES
CM sent referral to [facility] for S transport via [employer], pt accepted. Pt to discharge back to [facility] today via [facility] at 1130. PCS paperwork placed on bedside chart. FOC completed and placed on bedside chart. Pt unable to sign 2nd IM letter due to competency, mPOA is not present at this time to sign. Pt has no additional discharge needs at this time. Call Report [phone]. Please make sure any hard prescriptions for narcotics discharge with pt. Care Management Interventions  PCP Verified by CM: Yes  Palliative Care Criteria Met (RRAT>21 & CHF Dx)?: No  Mode of Transport at Discharge: BLS ([facility])  Hospital Transport Time of Discharge: 1130  Transition of Care Consult (CM Consult): [facility], Discharge Planning ([address] [place] [address])  Discharge Durable Medical Equipment: No (RW)  Health Maintenance Reviewed: Yes  Physical Therapy Consult: No  Occupational Therapy Consult: No  Speech Therapy Consult: Yes  Current Support Network: [facility], [address] [place] [address] (Resident at [facility];  Lived at [address] [place] [address] for 16 years, transitioned from [place] to [place] to [employer])  Confirm Follow Up Transport: Family  Plan discussed with Pt/Family/Caregiver: Yes  Freedom of Choice Offered: Yes  Discharge Location  Discharge Placement: [facility] ([facility]) ([id number] [place] [address])    BRUCE Krishna Supervisee in Social Work, [address] [place] [address]  [phone]

## 2018-01-05 NOTE — PROGRESS NOTES
Hospital to SNF SBAR Handoff - Rayleen Bolus                                                                        80 y.o.   female    [address]   Room: 2216/01    [facility] CARDIOPULMONARY CARE  Unit Phone# :  [phone]      [address]  [facility] CARDIOPULMONARY CARE  [facility]  [address] [address]  Dept: [phone]  Loc: [phone]                    SITUATION     Admitted:  12/29/2017         Attending Provider:  Juarez Goldstein MD       Consultations:  None    PCP:  Juarez Goldstein MD   [phone]    Treatment Team: Attending Provider: Juarez Goldstein MD; Hospitalist: Juliette Barker MD    Admitting Dx:  Acute respiratory failure [facility])       Principal Problem: Acute respiratory failure ([facility]    * No surgery found * of      BY: * Surgery not found *             ON: * No surgery found *                  Code Status: DNR                Advance Directives:   Advance Care Planning 12/30/2017   Patient's Healthcare Decision Maker is: Unable to obtain   Primary Decision Maker Name -   Primary Decision Maker Phone Number -   Primary Decision Maker Relationship to Patient -   Secondary Decision [address] Name -   Secondary Decision [address] Phone Number -   Secondary Decision Maker Relationship to Patient -   Confirm Advance Directive None   Does the patient have other document types -    (Send w/patient)   Yes Not W Pt       Isolation:  There are currently no Active Isolations       MDRO: No current active infections    Pain Medications given:  0    Last dose: 1/5/2018 at  0    Special Equipment needed: no  Type of equipment:    )     BACKGROUND     Allergies:  No Known Allergies    Past Medical History:   Diagnosis Date    CAD (coronary artery disease)     Chronic kidney disease (CKD), stage III (moderate)     GFR ~ 40 at baseline    Dementia     Diabetes ([facility]     Type 2 with renal manifestations    Gout     Hyperlipidemia     Hypertension     Hypothyroidism  Other ill-defined conditions(799.89)     sleep apnea    Other ill-defined conditions(799.89)     Cardiomyopathy    Syncope     Low blood sugar       Past Surgical History:   Procedure Laterality Date    CARDIAC SURG PROCEDURE UNLIST      defibrillator    HX ORTHOPAEDIC      knee surgery    HX PACEMAKER  09/23/2004    / dual lead       Prescriptions Prior to Admission   Medication Sig    metoprolol tartrate 75 mg tab Take 75 mg by mouth two (2) times a day.  insulin lispro (HUMALOG) 100 unit/mL injection 4 Units by SubCUTAneous route three (3) times daily. 'Hold if Blood sugar is below 120 - Notify Physician of Blood Sugar <60 and >350.'  'If above 350 give a total of 7 units.'    insulin NPH (HUMULIN N) 100 unit/mL injection 16 Units by SubCUTAneous route two (2) times a day. 'Hold for Blood Sugars below 120. Notify physician of Blood sugar <60 and >350.'    calcium carbonate (OYSTER SHELL CALCIUM) 500 mg calcium (1,250 mg) tablet Take 1 Tab by mouth two (2) times a day.  allopurinol (ZYLOPRIM) 100 mg tablet Take 1 Tab by mouth daily.  amLODIPine (NORVASC) 5 mg tablet Take 5 mg by mouth daily.  clopidogrel (PLAVIX) 75 mg tablet Take 75 mg by mouth daily.  atorvastatin (LIPITOR) 40 mg tablet Take 40 mg by mouth daily.  ranitidine (ZANTAC) 150 mg tablet Take 150 mg by mouth two (2) times a day.  levothyroxine (LEVOXYL) 88 mcg tablet Take 88 mcg by mouth daily (before breakfast).  acetaminophen (TYLENOL) 500 mg tablet Take 1,000 mg by mouth every four (4) hours as needed for Pain.  magnesium hydroxide (KING MILK OF MAGNESIA) 400 mg/5 mL suspension Take 30 mL by mouth daily as needed for Constipation.  glucagon (GLUCAGEN) 1 mg injection 1 mg by IntraVENous route as needed ('Hypoglycemia'). 'Give if blood sugar <60 and unconscious and call 911.  If no response in 15 minutes repeat.'       Hard scripts included in transfer packet yes    Vaccinations:    Immunization History Administered Date(s) Administered    Influenza Vaccine Whole 10/04/2010    ZZZ-RETIRED (DO NOT USE) Pneumococcal Vaccine (Unspecified Type) 10/01/2008       Readmission Risks:    Known Risks: no         The Charlson CoMorbitiy Index tool is an evidenced based tool that has more automatic generated information. The tool looks at many different items such as the age of the patient, how many times they were admitted in the last calendar year, current length of stay in the hospital and their diagnosis. All of these items are pulled automatically from information documented in the chart from various places and will generate a score that predicts whether a patient is at low (less than 13), medium (13-20) or high (21 or greater) risk of being readmitted.         ASSESSMENT                Temp:  (0) (01/05/18 0944) Pulse (Heart Rate): 91 (01/05/18 0854)     Resp Rate: 20 (01/05/18 0854)           BP: (!) 132/92 (01/05/18 0854)     O2 Sat (%): 93 % (01/05/18 0854)     Weight: 60.8 kg (134 lb)    Height: 5' 1\" (154.9 cm) (12/29/17 1718)       If above not within 1 hour of discharge:    BP:_____  P:____  R:____ T:_____ O2 Sat: ___%  O2: ______    Active Orders   Diet    DIET DYSPHAGIA [facility] (NDD2)         Orientation: disoriented     Active Behaviors: None                                   Active Lines/Drains:  (Peg Tube / Francis / CL or S/L?): yes    Urinary Status: Voiding     Last BM: Last Bowel Movement Date: 01/04/18     Skin Integrity: Intact             Mobility: Very limited   Weight Bearing Status: NWB (Non Weight Bearing)                Lab Results   Component Value Date/Time    Glucose 242 01/04/2018 01:52 AM    Hemoglobin A1c 7.2 12/30/2017 02:06 PM    INR 1.0 12/12/2017 12:06 PM    HGB 9.7 01/03/2018 01:59 AM    HGB 9.9 01/02/2018 02:15 AM        RECOMMENDATION     See After Visit Summary (AVS) for:  · Discharge instructions  · After [address]   · Special equipment needed (entered pre-discharge by Care Management)  · Medication Reconciliation    · Follow up Appointment(s)         Report given/sent by:  Tom Costello RN                    Verbal report given to: [facility]   FAXED to:   Paperwork sent to [facility]         Estimated discharge time:  1/5/2018 at now

## 2018-01-05 NOTE — DISCHARGE INSTRUCTIONS
PATIENT DISCHARGE INSTRUCTIONS  MyChart Activation    Thank you for requesting access to [employer]. Please follow the instructions below to securely access and download your online medical record. [employer] allows you to send messages to your doctor, view your test results, renew your prescriptions, schedule appointments, and more. How Do I Sign Up? 1. In your internet browser, go to [email or web address]  2. Click on the First Time User? Click Here link in the Sign In box. You will be redirect to the New Member Sign Up page. 3. Enter your [employer] Access Code exactly as it appears below. You will not need to use this code after youve completed the sign-up process. If you do not sign up before the expiration date, you must request a new code. [employer] Access Code: [email or web address]  Expires: 3/12/2018  4:16 PM (This is the date your [employer] access code will )    4. Enter the last four digits of your Social Security Number (xxxx) and Date of Birth (mm/dd/yyyy) as indicated and click Submit. You will be taken to the next sign-up page. 5. Create a [employer] ID. This will be your [employer] login ID and cannot be changed, so think of one that is secure and easy to remember. 6. Create a [employer] password. You can change your password at any time. 7. Enter your Password Reset Question and Answer. This can be used at a later time if you forget your password. 8. Enter your e-mail address. You will receive e-mail notification when new information is available in [address]. 9. Click Sign Up. You can now view and download portions of your medical record. 10. Click the Download Summary menu link to download a portable copy of your medical information. Additional Information    If you have questions, please visit the Frequently Asked Questions section of the [employer] website at [email or web address] Remember, [employer] is NOT to be used for urgent needs.  For medical emergencies, dial 911.          PATIENT DISCHARGE INSTRUCTIONS    Nelia Martinez / [id number] : 10/31/1924    Admitted 2017 Discharged: 2018       · It is important that you take the medication exactly as they are prescribed. · Keep your medication in the bottles provided by the pharmacist and keep a list of the medication names, dosages, and times to be taken in your wallet. · Do not take other medications without consulting your doctor.      What to do at Home    Recommended Diet: Diabetic Diet    Recommended Activity: Activity as tolerated            Signed By: Nathaly Saxena MD     2018

## 2018-01-05 NOTE — PROGRESS NOTES
PROGRESS NOTE    NAME:  Og Torre   :   10/31/1924   MRN:   [id number]     Date/Time:  2018 6:55 AM  Subjective:   History: stable medically. sleeping quite soundly. Medications reviewed:  Current Facility-Administered Medications   Medication Dose Route Frequency    metoprolol succinate (TOPROL-XL) XL tablet 50 mg  50 mg Oral DAILY    potassium chloride SR (KLOR-CON 10) tablet 10 mEq  10 mEq Oral TID    furosemide (LASIX) tablet 80 mg  80 mg Oral DAILY    insulin glargine (LANTUS) injection 10 Units  10 Units SubCUTAneous DAILY    insulin lispro (HUMALOG) injection   SubCUTAneous AC&HS    allopurinol (ZYLOPRIM) tablet 100 mg  100 mg Oral DAILY    amLODIPine (NORVASC) tablet 5 mg  5 mg Oral DAILY    levothyroxine (SYNTHROID) tablet 88 mcg  88 mcg Oral ACB    famotidine (PEPCID) tablet 20 mg  20 mg Oral DAILY        Objective:   Vitals:  Visit Vitals    /61 (BP 1 Location: Right arm, BP Patient Position: Lying left side)    Pulse 80    Temp 97.4 °F (36.3 °C)    Resp 20    Ht 5' 1\" (1.549 m)    Wt 134 lb (60.8 kg)    SpO2 96%    BMI 25.32 kg/m2    O2 Flow Rate (L/min): 2 l/min O2 Device: Nasal cannula Temp (24hrs), Av.6 °F (36.4 °C), Min:97.3 °F (36.3 °C), Max:97.9 °F (36.6 °C)      Last 24hr Input/Output:    Intake/Output Summary (Last 24 hours) at 18 0655  Last data filed at 18 1953   Gross per 24 hour   Intake              210 ml   Output              200 ml   Net               10 ml        PHYSICAL EXAM:  General:     sleeping, cooperative, no distress, appears stated age. Head:    Normocephalic, without obvious abnormality, atraumatic. Eyes:    Conjunctivae/corneas clear. PERRLA  Nose:   Nares normal. No drainage or sinus tenderness. Throat:     Lips, mucosa, and tongue normal.  No Thrush  Neck:   Supple, symmetrical,  no adenopathy, thyroid: non tender     no carotid bruit and no JVD. Back:     Symmetric,  No CVA tenderness.   Lungs:    Clear to auscultation bilaterally. No Wheezing or Rhonchi. No rales. Heart:    Regular rate and rhythm,  no murmur, rub or gallop. Abdomen:    Soft, non-tender. Not distended. Bowel sounds normal. No masses  Extremities:  Extremities normal, atraumatic, No cyanosis. No edema. No clubbing  Lymph nodes:  Cervical, supraclavicular normal.  Neurologic:  Normal strength,sleeping  Skin:                No rash      Lab Data Reviewed:    Recent Results (from the past 24 hour(s))   GLUCOSE, POC    Collection Time: 01/04/18  8:36 AM   Result Value Ref Range    Glucose (POC) 200 (H) 65 - 100 mg/dL    Performed by Maggie Pelaez    GLUCOSE, POC    Collection Time: 01/04/18 11:42 AM   Result Value Ref Range    Glucose (POC) 174 (H) 65 - 100 mg/dL    Performed by Maggie Pelaez    GLUCOSE, POC    Collection Time: 01/04/18  4:45 PM   Result Value Ref Range    Glucose (POC) 122 (H) 65 - 100 mg/dL    Performed by Archana Amin    GLUCOSE, POC    Collection Time: 01/04/18  8:47 PM   Result Value Ref Range    Glucose (POC) 208 (H) 65 - 100 mg/dL    Performed by Adis Brar          Assessment/Plan:     Principal Problem:    Acute respiratory failure ([facility] (12/29/2017)    Active Problems:    DM (diabetes mellitus), type 2, uncontrolled, with renal complications ([facility] (9/61/2600)      CKD (chronic kidney disease) stage 3, GFR 30-59 ml/min (8/23/2010)      Aspiration pneumonia ([facility] (12/30/2017)      Lactic acidosis (12/30/2017)      Dementia (12/30/2017)       ___________________________________________________  PLAN:    1. Discharge to health care at [facility].    2.   3.  :  4.            ___________________________________________________    Attending Physician: Chavez Galindo MD

## 2018-01-05 NOTE — PROGRESS NOTES
Report given to [facility], [facility]. Also let her know that her blood pressure was elevated but patient upset at being transferred. Patient upset when she was leaving but I explained to her that she was going back to [facility] and she calmed down and was quiet.

## 2018-01-05 NOTE — DISCHARGE SUMMARY
[facility]  MR#: [id number]  : 10/31/1924  ACCOUNT #: [de-identified]   ADMIT DATE: 2017  DISCHARGE DATE:     FINAL DIAGNOSES:  1. Right lower lobe aspiration pneumonia. 2.  Acute respiratory failure. 3.  Urinary tract infection. 4.  Advanced dementia. 5.  Type 2 diabetes. 6.  Arteriosclerotic heart disease, status post automatic implantable cardiac defibrillator insertion. 7.  Hypothyroidism, treated. DISCHARGE MEDICATIONS:  Allopurinol 100 mg daily, amlodipine 5 mg daily, Zantac 150 mg b.i.d., Levoxyl 0.088 mg daily, Lasix 160 mg q.a.m., potassium chloride 10 mEq t.i.d., metoprolol 75 mg b.i.d., Humulin N 16 units b.i.d., Humalog 4 units t.i.d. a.c. Keflex 500 mg t.i.d. for 5 days. DISCHARGE DIET:  Diabetic. DISPOSITION:  The patient discharged to [facility], where she will be followed by this physician. HISTORY OF PRESENT ILLNESS:  The patient is a 80-year-old white female resident of [facility], who had an episode of vomiting and was observed to aspirate. She developed respiratory distress, and was referred to the emergency room. PHYSICAL EXAMINATION:  VITAL SIGNS:  On admission, temp 98.1, blood pressure 124/46, pulse 100 and regular, O2 saturation on supplemental oxygen 93%. GENERAL:  She is a lethargic, uncooperative elderly white female. HEAD, EYES, EARS, NOSE AND THROAT:  Unremarkable. LUNGS:  Reveal scattered wheezes and rhonchi throughout both lungs. HEART:  Distant heart sounds, regular rate and rhythm. No murmurs or gallops. ABDOMEN:  Soft, nontender, without hepatosplenomegaly. EXTREMITIES:  Without edema. No pedal pulses. No pulses palpable of the femoral in the legs. NEUROLOGIC:  Nonfocal.  Strength appeared to be equal bilaterally. She was lethargic and completely disoriented.     LABORATORY DATA:  Hemoglobin 12.2, hematocrit 37, white blood cell count 7800, platelet count 113,712. Sodium 138, potassium 4.3, chloride 101, CO2 of 27, BUN 41, blood sugar 243, creatinine 2.3. Bilirubin and liver function test normal.  ProBNP 1939. Troponin less than 0.04. Lactic acid 5. Urinalysis showed greater than 100 white cells per high-power field, 3+ bacteria. Urine culture grew E. coli sensitive to most antibiotics. Blood cultures, no growth. DIAGNOSTIC DATA:  Chest x-ray showed a right lower lobe infiltrate. EKG, normal sinus rhythm, left axis deviation, LVH, no change compared to prior tracings. HOSPITAL COURSE:  The patient was admitted, given IV hydration and started on IV Zosyn. Once the urine culture came back, she was changed to ceftriaxone. She was intermittently to severely confused throughout the whole admission. She was intermittently combative. Followup chest x-ray showed stable right lower lobe infiltrate, but no further x-rays could be obtained due to patient's uncooperative nature. She was treated with a total of 7 days of IV antibiotics. She was discharged to [facility] with the above instructions and medications. She is a DNR .       MD FOUZIA López/TRICE  D: 01/05/2018 06:54     T: 01/05/2018 07:16  JOB #: [id number]

## 2018-01-05 NOTE — PROGRESS NOTES
Bedside and Verbal shift change report given to Nancy MARINELLI (oncoming nurse) by Almita Lipscomb (offgoing nurse). Report included the following information SBAR, Kardex and Recent Results. SHIFT SUMMARY:            [address] NURSING NOTE   Admission Date 12/29/2017   Admission Diagnosis Acute respiratory failure ([address]   Consults None      Cardiac Monitoring [x] Yes [] No      Purposeful Hourly Rounding [x] Yes    Billie Score Total Score: 3   Billie score 3 or > [x] Bed Alarm [] Avasys [] 1:1 sitter [] Patient refused (Place signed refusal form in chart)   Lewis Score Lewis Score: 14   Lewis score 14 or < [x] PMT consult [] Wound Care consult    []  Specialty bed  [] Nutrition consult      Influenza Vaccine Received Flu Vaccine for Current Season (usually Sept-March): Unsure    Patient/Guardian Refused (Notify MD): Yes (called Severino MACKENZIE - refused)      Oxygen needs? [] Room air Oxygen @  []1L    [x]2L    []3L   []4L    []5L   []6L     Use home O2?  [x] Yes [] No  Perform O2 challenge test using  smartphrase (.Homeoxygen)      Last bowel movement Last Bowel Movement Date: 01/04/18      Urinary Catheter       External Female Catheter 12/30/17-Urine Output (mL): 500 ml     LDAs               Peripheral IV 01/01/18 Left Arm (Active)   Site Assessment Clean, dry, & intact 1/5/2018  2:45 AM   Phlebitis Assessment 0 1/5/2018  2:45 AM   Infiltration Assessment 0 1/5/2018  2:45 AM   Dressing Status Clean, dry, & intact 1/5/2018  2:45 AM   Dressing Type Transparent 1/5/2018  2:45 AM   Hub Color/Line Status Patent;Yellow 1/5/2018  2:45 AM          External Female Catheter 12/30/17 (Active)   Site Assessment Clean, dry, & intact 12/31/2017 11:11 PM   Repositioned Yes 12/31/2017 11:11 PM   Perineal Care Yes 12/31/2017 11:11 PM   Wick Changed Yes 12/31/2017 11:11 PM   Suction Canister/Tubing Changed Yes 12/31/2017 11:11 PM   Urine Output (mL) 500 ml 12/31/2017 11:11 PM                   Readmission Risk Assessment Tool Score High Risk            44       Total Score        3 Has Seen PCP in Last 6 Months (Yes=3, No=0)    3 Patient Length of Stay (>5 days = 3)    5 Pt. Coverage (Medicare=5 , Medicaid, or Self-Pay=4)    33 Charlson Comorbidity Score (Age + Comorbid Conditions)        Criteria that do not apply:    . Living with Significant Other. Assisted Living. LTAC. SNF.  or   Rehab    IP Visits Last 12 Months (1-3=4, 4=9, >4=11)       Expected Length of Stay 3d 16h   Actual Length of Stay 7

## 2018-01-05 NOTE — PROGRESS NOTES
Patient resting comfortable. Received report from Stephan on this patient. She is verbalizing a small amount.

## 2018-01-11 ENCOUNTER — TELEPHONE (OUTPATIENT)
Dept: INTERNAL MEDICINE CLINIC | Age: 83
End: 2018-01-11

## 2018-01-11 NOTE — TELEPHONE ENCOUNTER
Felecia garcia phoned office states pts BS this am 257   Total of 20 units of insulin given    BS dropped to 50's  Was given orange juice  Chol chip cookies and turkey sandwich   BS now 115   Instructed by Dr. Hayden Saenz to hold insulin for the rest of the day

## 2018-01-17 ENCOUNTER — TELEPHONE (OUTPATIENT)
Dept: INTERNAL MEDICINE CLINIC | Age: 83
End: 2018-01-17

## 2018-01-17 NOTE — TELEPHONE ENCOUNTER
Nurse Linnette Daly) called stating Ms. Palomo's blood sugars have been dropping into the 50's after her morning dose of insulin.   Per Dr Ankita Hale - D/C Humalog, but continue Humulin N.

## 2018-06-21 ENCOUNTER — OFFICE VISIT (OUTPATIENT)
Dept: INTERNAL MEDICINE CLINIC | Age: 83
End: 2018-06-21

## 2018-06-21 DIAGNOSIS — I10 ESSENTIAL HYPERTENSION: Primary | ICD-10-CM

## 2018-09-08 ENCOUNTER — HOSPITAL ENCOUNTER (EMERGENCY)
Age: 83
Discharge: HOME | End: 2018-09-08
Attending: EMERGENCY MEDICINE
Payer: MEDICARE

## 2018-09-08 ENCOUNTER — APPOINTMENT (OUTPATIENT)
Dept: CT IMAGING | Age: 83
End: 2018-09-08
Attending: EMERGENCY MEDICINE
Payer: MEDICARE

## 2018-09-08 VITALS
TEMPERATURE: 97.4 F | HEART RATE: 63 BPM | SYSTOLIC BLOOD PRESSURE: 169 MMHG | RESPIRATION RATE: 14 BRPM | OXYGEN SATURATION: 94 % | DIASTOLIC BLOOD PRESSURE: 58 MMHG

## 2018-09-08 DIAGNOSIS — N39.0 URINARY TRACT INFECTION WITHOUT HEMATURIA, SITE UNSPECIFIED: ICD-10-CM

## 2018-09-08 DIAGNOSIS — S02.2XXA CLOSED FRACTURE OF NASAL BONE, INITIAL ENCOUNTER: Primary | ICD-10-CM

## 2018-09-08 DIAGNOSIS — E16.2 HYPOGLYCEMIA: ICD-10-CM

## 2018-09-08 LAB
ALBUMIN SERPL-MCNC: 2.7 G/DL (ref 3.5–5)
ALBUMIN/GLOB SERPL: 0.6 {RATIO} (ref 1.1–2.2)
ALP SERPL-CCNC: 123 U/L (ref 45–117)
ALT SERPL-CCNC: 20 U/L (ref 12–78)
ANION GAP SERPL CALC-SCNC: 8 MMOL/L (ref 5–15)
APPEARANCE UR: ABNORMAL
AST SERPL-CCNC: 27 U/L (ref 15–37)
BACTERIA URNS QL MICRO: ABNORMAL /HPF
BASOPHILS # BLD: 0 K/UL (ref 0–0.1)
BASOPHILS NFR BLD: 0 % (ref 0–1)
BILIRUB SERPL-MCNC: 0.4 MG/DL (ref 0.2–1)
BILIRUB UR QL: NEGATIVE
BUN SERPL-MCNC: 24 MG/DL (ref 6–20)
BUN/CREAT SERPL: 18 (ref 12–20)
CALCIUM SERPL-MCNC: 8.4 MG/DL (ref 8.5–10.1)
CHLORIDE SERPL-SCNC: 106 MMOL/L (ref 97–108)
CO2 SERPL-SCNC: 27 MMOL/L (ref 21–32)
COLOR UR: ABNORMAL
CREAT SERPL-MCNC: 1.35 MG/DL (ref 0.55–1.02)
DIFFERENTIAL METHOD BLD: ABNORMAL
EOSINOPHIL # BLD: 0.1 K/UL (ref 0–0.4)
EOSINOPHIL NFR BLD: 1 % (ref 0–7)
EPITH CASTS URNS QL MICRO: ABNORMAL /LPF
ERYTHROCYTE [DISTWIDTH] IN BLOOD BY AUTOMATED COUNT: 15.1 % (ref 11.5–14.5)
GLOBULIN SER CALC-MCNC: 4.8 G/DL (ref 2–4)
GLUCOSE BLD STRIP.AUTO-MCNC: 217 MG/DL (ref 65–100)
GLUCOSE BLD STRIP.AUTO-MCNC: 50 MG/DL (ref 65–100)
GLUCOSE SERPL-MCNC: 45 MG/DL (ref 65–100)
GLUCOSE UR STRIP.AUTO-MCNC: NEGATIVE MG/DL
HCT VFR BLD AUTO: 34 % (ref 35–47)
HGB BLD-MCNC: 11.1 G/DL (ref 11.5–16)
HGB UR QL STRIP: ABNORMAL
IMM GRANULOCYTES # BLD: 0.2 K/UL (ref 0–0.04)
IMM GRANULOCYTES NFR BLD AUTO: 1 % (ref 0–0.5)
INR BLD: 1.1 (ref 0.9–1.2)
KETONES UR QL STRIP.AUTO: NEGATIVE MG/DL
LEUKOCYTE ESTERASE UR QL STRIP.AUTO: ABNORMAL
LYMPHOCYTES # BLD: 2.8 K/UL (ref 0.8–3.5)
LYMPHOCYTES NFR BLD: 22 % (ref 12–49)
MCH RBC QN AUTO: 31.5 PG (ref 26–34)
MCHC RBC AUTO-ENTMCNC: 32.6 G/DL (ref 30–36.5)
MCV RBC AUTO: 96.6 FL (ref 80–99)
MONOCYTES # BLD: 1.3 K/UL (ref 0–1)
MONOCYTES NFR BLD: 11 % (ref 5–13)
NEUTS SEG # BLD: 8.4 K/UL (ref 1.8–8)
NEUTS SEG NFR BLD: 66 % (ref 32–75)
NITRITE UR QL STRIP.AUTO: NEGATIVE
NRBC # BLD: 0 K/UL (ref 0–0.01)
NRBC BLD-RTO: 0 PER 100 WBC
PH UR STRIP: 7 [PH] (ref 5–8)
PLATELET # BLD AUTO: 237 K/UL (ref 150–400)
PMV BLD AUTO: 8.8 FL (ref 8.9–12.9)
POTASSIUM SERPL-SCNC: 4 MMOL/L (ref 3.5–5.1)
PROT SERPL-MCNC: 7.5 G/DL (ref 6.4–8.2)
PROT UR STRIP-MCNC: 300 MG/DL
RBC # BLD AUTO: 3.52 M/UL (ref 3.8–5.2)
RBC #/AREA URNS HPF: ABNORMAL /HPF (ref 0–5)
SERVICE CMNT-IMP: ABNORMAL
SERVICE CMNT-IMP: ABNORMAL
SODIUM SERPL-SCNC: 141 MMOL/L (ref 136–145)
SP GR UR REFRACTOMETRY: 1.01 (ref 1–1.03)
UA: UC IF INDICATED,UAUC: ABNORMAL
UROBILINOGEN UR QL STRIP.AUTO: 0.2 EU/DL (ref 0.2–1)
WBC # BLD AUTO: 12.8 K/UL (ref 3.6–11)
WBC URNS QL MICRO: >100 /HPF (ref 0–4)

## 2018-09-08 PROCEDURE — 87186 SC STD MICRODIL/AGAR DIL: CPT | Performed by: EMERGENCY MEDICINE

## 2018-09-08 PROCEDURE — 96375 TX/PRO/DX INJ NEW DRUG ADDON: CPT

## 2018-09-08 PROCEDURE — 96365 THER/PROPH/DIAG IV INF INIT: CPT

## 2018-09-08 PROCEDURE — 74011000258 HC RX REV CODE- 258: Performed by: EMERGENCY MEDICINE

## 2018-09-08 PROCEDURE — 82962 GLUCOSE BLOOD TEST: CPT

## 2018-09-08 PROCEDURE — 87077 CULTURE AEROBIC IDENTIFY: CPT | Performed by: EMERGENCY MEDICINE

## 2018-09-08 PROCEDURE — 99285 EMERGENCY DEPT VISIT HI MDM: CPT

## 2018-09-08 PROCEDURE — 74011250636 HC RX REV CODE- 250/636: Performed by: EMERGENCY MEDICINE

## 2018-09-08 PROCEDURE — 85025 COMPLETE CBC W/AUTO DIFF WBC: CPT | Performed by: EMERGENCY MEDICINE

## 2018-09-08 PROCEDURE — 81001 URINALYSIS AUTO W/SCOPE: CPT | Performed by: EMERGENCY MEDICINE

## 2018-09-08 PROCEDURE — 80053 COMPREHEN METABOLIC PANEL: CPT | Performed by: EMERGENCY MEDICINE

## 2018-09-08 PROCEDURE — 74011000250 HC RX REV CODE- 250: Performed by: EMERGENCY MEDICINE

## 2018-09-08 PROCEDURE — 72125 CT NECK SPINE W/O DYE: CPT

## 2018-09-08 PROCEDURE — 36415 COLL VENOUS BLD VENIPUNCTURE: CPT | Performed by: EMERGENCY MEDICINE

## 2018-09-08 PROCEDURE — 70486 CT MAXILLOFACIAL W/O DYE: CPT

## 2018-09-08 PROCEDURE — 85610 PROTHROMBIN TIME: CPT

## 2018-09-08 PROCEDURE — 70450 CT HEAD/BRAIN W/O DYE: CPT

## 2018-09-08 PROCEDURE — 87086 URINE CULTURE/COLONY COUNT: CPT | Performed by: EMERGENCY MEDICINE

## 2018-09-08 RX ORDER — DEXTROSE 50 % IN WATER (D50W) INTRAVENOUS SYRINGE
50
Status: COMPLETED | OUTPATIENT
Start: 2018-09-08 | End: 2018-09-08

## 2018-09-08 RX ORDER — CEPHALEXIN 500 MG/1
500 CAPSULE ORAL 4 TIMES DAILY
Qty: 28 CAP | Refills: 0 | Status: SHIPPED | OUTPATIENT
Start: 2018-09-08 | End: 2018-09-15

## 2018-09-08 RX ADMIN — DEXTROSE MONOHYDRATE 25 G: 25 INJECTION, SOLUTION INTRAVENOUS at 03:27

## 2018-09-08 RX ADMIN — CEFTRIAXONE 1 G: 1 INJECTION, POWDER, FOR SOLUTION INTRAMUSCULAR; INTRAVENOUS at 05:54

## 2018-09-08 NOTE — DISCHARGE INSTRUCTIONS
Facial Fracture: Care Instructions  Your Care Instructions  You have broken (fractured) one or more bones in your face. Swelling and bruising from the injury are likely to get worse over the first couple of days. After that, the swelling should steadily improve until it is gone. If you have bruises on your face, they may change as they heal. The skin may turn from black and blue to green to yellow or brown before it returns to its normal color. It may take several weeks for your injury to heal.  It is very important that you get follow-up care as directed so that the injury heals properly and does not lead to problems. The kind of care and treatment you will need depends on the specific type of break (or breaks) you have. You heal best when you take good care of yourself. Eat a variety of healthy foods, and don't smoke. Follow-up care is a key part of your treatment and safety. Be sure to make and go to all appointments, and call your doctor if you are having problems. It's also a good idea to know your test results and keep a list of the medicines you take. How can you care for yourself at home? · Put ice or a cold pack on your injury for 10 to 20 minutes at a time. Try to do this every 1 to 2 hours for the next 3 days (when you are awake) or until the swelling goes down. Put a thin cloth between the ice pack and your skin. · Go to all follow-up appointments with your doctor. Your doctor will determine whether you need further treatment, including surgery. · Take your medicines exactly as prescribed. Call your doctor if you think you are having a problem with your medicine. You will get more details on the specific medicines your doctor prescribes. · If your doctor prescribed antibiotics, take them exactly as directed. Do not stop taking them just because you feel better. You need to take the full course of antibiotics. · Be safe with medicines. Read and follow all instructions on the label.   ¨ If the doctor gave you a prescription medicine for pain, take it as prescribed. ¨ If you are not taking a prescription pain medicine, ask your doctor if you can take an over-the-counter medicine. · Keep your head elevated when you sleep. · Eat soft food to decrease jaw pain. · Do not blow your nose. Dab it with a tissue if you need to. When should you call for help? Call 911 anytime you think you may need emergency care. For example, call if:    · You have a seizure.     · You passed out (lost consciousness).     · You have tingling, weakness, or numbness on one side of your body.    Call your doctor now or seek immediate medical care if:    · You have a severe headache.     · You develop double vision.     · You have a fever and stiff neck.     · Clear, watery fluid drains from your nose.     · You feel dizzy or lightheaded.     · You have new eye pain or changes in your vision, such as blurring.     · You have new ear pain, ringing in your ears, or trouble hearing.     · You are confused, irritable, or not acting normally.     · You have a hard time standing, walking, or talking.     · You have new mouth or tooth pain, or you have trouble chewing.     · You have increasing pain even after you have taken your pain medicine.    Watch closely for changes in your health, and be sure to contact your doctor if:    · You develop a cough, cold, or sinus infection.     · The symptoms from your injury are not steadily improving. Where can you learn more? Go to http://breana-evan.info/. Enter 0664 [id number] [id number] in the search box to learn more about \"Facial Fracture: Care Instructions. \"  Current as of: October 9, 2017  Content Version: 11.7  © 4233-7625 [employer]. Care instructions adapted under license by [employer] (which disclaims liability or warranty for this information).  If you have questions about a medical condition or this instruction, always ask your healthcare professional. [employer], [facility] disclaims any warranty or liability for your use of this information. Hypoglycemia: Care Instructions  Your Care Instructions    Hypoglycemia means that your blood sugar is low and your body is not getting enough fuel. Some people get low blood sugar from not eating often enough. Some medicines to treat diabetes can cause low blood sugar. People who have had surgery on their stomachs or intestines may get hypoglycemia. Problems with the pancreas, kidneys, or liver also can cause low blood sugar. A snack or drink with sugar in it will raise your blood sugar and should ease your symptoms right away. Your doctor may recommend that you change or stop your medicines until you can get your blood sugar levels under control. In the long run, you may need to change your diet and eating habits so that you get enough fuel for your body throughout the day. Follow-up care is a key part of your treatment and safety. Be sure to make and go to all appointments, and call your doctor if you are having problems. It's also a good idea to know your test results and keep a list of the medicines you take. How can you care for yourself at home? · Learn to recognize the early signs of low blood sugar. Signs include:  ¨ Nausea. ¨ Hunger. ¨ Feeling nervous, irritable, or shaky. ¨ Cold, clammy, wet skin. ¨ Sweating (when you are not exercising). ¨ A fast heartbeat. ¨ Numbness or tingling of the fingertips or lips. · If you feel an episode of low blood sugar coming on, drink fruit juice or sugared (not diet) soda, or eat sugar in the form of candy, cubes, or tablets. [employer] [place] [employer] are another [employer]. · Eat small, frequent meals so that you do not get too hungry between meals. · Balance extra exercise with eating more. · Keep a written record of your low blood sugar episodes, including when you last ate and what you ate, so that you can learn what causes your blood sugar to drop.   · Make sure your family, friends, and coworkers know the symptoms of low blood sugar and know what to do to get your sugar level up. · Wear medical alert jewelry that lists your condition. You can buy this at most drugstores. When should you call for help? Call 911 anytime you think you may need emergency care. For example, call if:    · You passed out (lost consciousness).     · You are confused or cannot think clearly.     · Your blood sugar is very high or very low.    Watch closely for changes in your health, and be sure to contact your doctor if:    · Your blood sugar stays outside the level your doctor set for you.     · You have any problems. Where can you learn more? Go to http://breana-evan.info/. Enter P101 in the search box to learn more about \"Hypoglycemia: Care Instructions. \"  Current as of: December 7, 2017  Content Version: 11.7  © 7243-0525 [employer]. Care instructions adapted under license by [employer] (which disclaims liability or warranty for this information). If you have questions about a medical condition or this instruction, always ask your healthcare professional. [place] [address] any warranty or liability for your use of this information. Urinary Tract Infection in Women: Care Instructions  Your Care Instructions    A urinary tract infection, or UTI, is a general term for an infection anywhere between the kidneys and the urethra (where urine comes out). Most UTIs are bladder infections. They often cause pain or burning when you urinate. UTIs are caused by bacteria and can be cured with antibiotics. Be sure to complete your treatment so that the infection goes away. Follow-up care is a key part of your treatment and safety. Be sure to make and go to all appointments, and call your doctor if you are having problems. It's also a good idea to know your test results and keep a list of the medicines you take.   How can you care for yourself at home? · Take your antibiotics as directed. Do not stop taking them just because you feel better. You need to take the full course of antibiotics. · Drink extra water and other fluids for the next day or two. This may help wash out the bacteria that are causing the infection. (If you have kidney, heart, or liver disease and have to limit fluids, talk with your doctor before you increase your fluid intake.)  · Avoid drinks that are carbonated or have caffeine. They can irritate the bladder. · Urinate often. Try to empty your bladder each time. · To relieve pain, take a hot bath or lay a heating pad set on low over your lower belly or genital area. Never go to sleep with a heating pad in place. To prevent UTIs  · Drink plenty of water each day. This helps you urinate often, which clears bacteria from your system. (If you have kidney, heart, or liver disease and have to limit fluids, talk with your doctor before you increase your fluid intake.)  · Urinate when you need to. · Urinate right after you have sex. · Change sanitary pads often. · Avoid douches, bubble baths, feminine hygiene sprays, and other feminine hygiene products that have deodorants. · After going to the bathroom, wipe from front to back. When should you call for help? Call your doctor now or seek immediate medical care if:    · Symptoms such as fever, chills, nausea, or vomiting get worse or appear for the first time.     · You have new pain in your back just below your rib cage. This is called flank pain.     · There is new blood or pus in your urine.     · You have any problems with your antibiotic medicine.    Watch closely for changes in your health, and be sure to contact your doctor if:    · You are not getting better after taking an antibiotic for 2 days.     · Your symptoms go away but then come back. Where can you learn more? Go to http://breana-evan.info/.   Enter S507 in the search box to learn more about \"Urinary Tract Infection in Women: Care Instructions. \"  Current as of: May 12, 2017  Content Version: 11.7  © 5559-8136 [employer]. Care instructions adapted under license by [employer] (which disclaims liability or warranty for this information). If you have questions about a medical condition or this instruction, always ask your healthcare professional. [address] any warranty or liability for your use of this information.

## 2018-09-08 NOTE — ED TRIAGE NOTES
Pt arrives via EMS for c/o fall. Pt was found on the floor on her face for undetermined time. Pt has hx of dementia. Pt is non verbal at this time. Contusions and abrasions to Rt side of facwe. Swelling noted. Bilateral nose bleeds
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
## 2018-09-10 LAB
BACTERIA SPEC CULT: ABNORMAL
CC UR VC: ABNORMAL
SERVICE CMNT-IMP: ABNORMAL
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
## 2022-04-13 NOTE — CDMP QUERY
Please give the clinical significance of the following lab data. urine culture-1/1/2018  Special Requests: NO SPECIAL REQUESTS     Final     [place] Count >100,000   COLONIES/mL        Final    Culture result: ESCHERICHIA COLI (A)    Final         Please clarify and document your clinical opinion in the progress notes and discharge summary including the definitive and/or presumptive diagnosis, (suspected or probable), related to the above clinical findings. Please include clinical findings supporting your diagnosis.   Thank [address] [place] [address], [address] [place] [address] Rd     [phone]
Initiate Treatment: Selsum blue and cutar otc treatment
Render In Strict Bullet Format?: No
Discontinue Regimen: Dermasmoothe oil\\n\\nPromiseb topical cream \\nSig: Apply to scalp Monday, Wednesday, and Friday night leave on overnight and rinse in the morning
Detail Level: Zone

## 2022-09-29 NOTE — IP AVS SNAPSHOT
[address] [facility]. 
[phone] Patient: Rosendo Ramos MRN: [id number] :10/31/1924 A check meera indicates which time of day the medication should be taken. My Medications START taking these medications Instructions Each Dose to Equal  
 Morning Noon Evening Bedtime  
 cephALEXin 500 mg capsule Commonly known as:  Archana Nick Your last dose was: Your next dose is: Take 1 Cap by mouth three (3) times daily. 500 mg  
    
   
   
   
  
 potassium chloride SR 10 mEq tablet Commonly known as:  KLOR-CON 10 Your last dose was: Your next dose is: Take 1 Tab by mouth three (3) times daily. 10 mEq CHANGE how you take these medications Instructions Each Dose to Equal  
 Morning Noon Evening Bedtime  
 furosemide 80 mg tablet Commonly known as:  LASIX What changed:   
- when to take this - Another medication with the same name was removed. Continue taking this medication, and follow the directions you see here. Your last dose was: Your next dose is: Take 2 Tabs by mouth daily. 160 mg CONTINUE taking these medications Instructions Each Dose to Equal  
 Morning Noon Evening Bedtime  
 acetaminophen 500 mg tablet Commonly known as:  TYLENOL Your last dose was: Your next dose is: Take 1,000 mg by mouth every four (4) hours as needed for Pain. 1000 mg  
    
   
   
   
  
 allopurinol 100 mg tablet Commonly known as:  Garrett Sanchez Your last dose was: Your next dose is: Take 1 Tab by mouth daily. 100 mg  
    
   
   
   
  
 amLODIPine 5 mg tablet Commonly known as:  Robert Means Your last dose was: Your next dose is: Take 5 mg by mouth daily. 5 mg Assessment/Plan:       Stable at this point  She is being monitored more closely at the personal care facility  I told her and her daughter that as her vertigo is resolving, her pressure may start to go down as she feels better so we may need to reduce medicines moving forward  Continue follow-up with her specialist   Will reassess at already scheduled follow-up visit  Quality Measures:       Return for Next scheduled follow up  No problem-specific Assessment & Plan notes found for this encounter  Diagnoses and all orders for this visit:    Primary hypertension    Vertigo    Type 2 diabetes mellitus with hyperglycemia, with long-term current use of insulin ([facility])    Other orders  -     timolol (TIMOPTIC) 0 5 % ophthalmic solution  -     Glucagon 3 MG/DOSE POWD; 3 mg  -     atorvastatin (LIPITOR) 80 mg tablet  -     Rhopressa 0 02 % SOLN        Subjective:      Patient ID: Peg Estes is a 80 y o  female  Patient comes in today for hospital follow-up  She had gone with dizziness and vertigo  She has had this before but usually would just last a day  This was more persistent  In the ER she was complaining of vertigo but also her blood pressure was markedly elevated  They also noticed a low sugar as well  She had been feeling well so she probably was not eating  The sugar was rapidly corrected  She was started on medicines for her blood pressure  But the vertigo persisted  She was given meclizine with good results  Ordered therapy for that  No other significant findings were found  Since discharge she just feels tired  Sugars are controlled  Blood pressure is controlled  Vertigo is pretty much gone but she is taking the meclizine  Hospital records were reviewed        TCM Call     Date and time call was made  9/27/2022 10:28 AM    Hospital care reviewed  Records reviewed    Patient was hospitialized at  [facility]    Date of Admission  09/20/22    Date of discharge  09/26/22 Diagnosis  Congestive Heart Failure    Disposition  Home    Were the patients medications reviewed and updated  Yes    Current Symptoms  None      TCM Call     Post hospital issues  None    Should patient be enrolled in anticoag monitoring? No    Scheduled for follow up?   Yes    Did you obtain your prescribed medications  Yes    Do you need help managing your prescriptions or medications  No    Is transportation to your appointment needed  No    I have advised the patient to call PCP with any new or worsening symptoms    mckenzie ricardo RMA    Living Arrangements  Family members    Support System  None    Are you recieving any outpatient services  No    Are you recieving home care services  No    Are you using any community resources  No    Current waiver services  No    Have you fallen in the last 12 months  No    Interperter language line needed  No    Counseling  Patient          ALLERGIES:  Allergies   Allergen Reactions    Hydrocodone-Acetaminophen Nausea Only       CURRENT MEDICATIONS:    Current Outpatient Medications:     amLODIPine (NORVASC) 10 mg tablet, Take 1 tablet (10 mg total) by mouth daily, Disp: 30 tablet, Rfl: 0    atorvastatin (LIPITOR) 80 mg tablet, , Disp: , Rfl:     Blood Glucose Monitoring Suppl (Glucocard Vital Monitor) w/Device KIT, Use 4 (four) times a day E11 65, Disp: 1 kit, Rfl: 1    calcium polycarbophil (FIBERCON) 625 mg tablet, Take 1 tablet (625 mg total) by mouth daily, Disp: 90 tablet, Rfl: 3    Cyanocobalamin (B-12) 1000 MCG TABS, Take 1,000 mcg by mouth daily, Disp: 90 tablet, Rfl: 3    dorzolamide (TRUSOPT) 2 % ophthalmic solution, , Disp: , Rfl:     Eliquis 5 MG, Take 5 mg by mouth 2 (two) times a day , Disp: , Rfl:     furosemide (LASIX) 20 mg tablet, 1 5 TAB (30MG) ORALLY TWICE DAILY AT 8AM & 5PM DX: EDEMA, Disp: 84 tablet, Rfl: 4    Glucagon 3 MG/DOSE POWD, 3 mg, Disp: , Rfl:     glucose blood (Glucocard Vital Test) test strip, Check sugar up to 4 times a glucagon 1 mg injection Commonly known as:  Kellen Oates Your last dose was: Your next dose is:    
   
   
 1 mg by IntraVENous route as needed ('Hypoglycemia'). 'Give if blood sugar <60 and unconscious and call 911. If no response in 15 minutes repeat.'  
 1 mg HumaLOG 100 unit/mL injection Generic drug:  insulin lispro Your last dose was: Your next dose is:    
   
   
 4 Units by SubCUTAneous route three (3) times daily. 'Hold if Blood sugar is below 120 - Notify Physician of Blood Sugar <60 and >350.' 'If above 350 give a total of 7 units. '  
 4 Units HumuLIN N 100 unit/mL injection Generic drug:  insulin NPH Your last dose was: Your next dose is:    
   
   
 16 Units by SubCUTAneous route two (2) times a day. 'Hold for Blood Sugars below 120. Notify physician of Blood sugar <60 and >350.'  
 16 Units LEVOXYL 88 mcg tablet Generic drug:  levothyroxine Your last dose was: Your next dose is: Take 88 mcg by mouth daily (before breakfast). 88 mcg  
    
   
   
   
  
 metoprolol tartrate 75 mg Tab Your last dose was: Your next dose is: Take 75 mg by mouth two (2) times a day. 75 mg  
    
   
   
   
  
 ZANTAC 150 mg tablet Generic drug:  raNITIdine Your last dose was: Your next dose is: Take 150 mg by mouth two (2) times a day. 150 mg  
    
   
   
   
  
  
STOP taking these medications LIPITOR 40 mg tablet Generic drug:  atorvastatin OYSTER SHELL CALCIUM 500 mg calcium (1,250 mg) tablet Generic drug:  calcium carbonate KING MILK OF MAGNESIA 400 mg/5 mL suspension Generic drug:  magnesium hydroxide PLAVIX 75 mg Tab Generic drug:  clopidogrel Where to Get Your Medications Information on where to get these meds will be given to you by the nurse or doctor. ! Ask your nurse or doctor about these medications  
  cephALEXin 500 mg capsule  
 furosemide 80 mg tablet  
 potassium chloride SR 10 mEq tablet day, Disp: 100 each, Rfl: 4    Insulin Glargine Solostar (Lantus SoloStar) 100 UNIT/ML SOPN, Inject 0 15 mL (15 Units total) under the skin daily at bedtime, Disp: 9 mL, Rfl: 0    insulin lispro (HumaLOG) 100 units/mL injection pen, Inject 3 Units under the skin 3 (three) times a day with meals, Disp: 10 mL, Rfl: 0    Insulin Pen Needle (NovoFine Autocover) 30G X 8 MM MISC, Inject under the skin 4 (four) times a day, Disp: 100 each, Rfl: 4    latanoprost (XALATAN) 0 005 % ophthalmic solution, INSTILL 1 DROP INTO RIGHT EYE AT BEDTIME DX: GLAUCOMA **DISCARD 6 WK AFTER OPENED**, Disp: 2 5 mL, Rfl: 4    levothyroxine 50 mcg tablet, 1 TAB ORALLY EVERY MORNING DX: HYPOTHYROIDISM, Disp: , Rfl:     losartan (COZAAR) 100 MG tablet, Take 0 5 tablets (50 mg total) by mouth daily, Disp: 30 tablet, Rfl: 0    meclizine (ANTIVERT) 25 mg tablet, Take 1 tablet (25 mg total) by mouth every 8 (eight) hours as needed for dizziness, Disp: 30 tablet, Rfl: 0    metoprolol succinate (TOPROL-XL) 50 mg 24 hr tablet, 1 TAB ORALLY TWICE DAILY DX: HYPERTENSION, Disp: , Rfl:     Multiple Vitamins-Calcium (One-A-Day Womens Formula) TABS, Take 1 tablet by mouth every morning Dx: Supplement, Disp: 30 tablet, Rfl: 2    Nutritional Supplements (Ensure Complete Shake) LIQD, Take 1 each by mouth 3 (three) times a day, Disp: 711 mL, Rfl: 3    omeprazole (PriLOSEC) 20 mg delayed release capsule, 1 CAP ORALLY EVERY MORNING DX: GERD, Disp: 28 capsule, Rfl: 4    potassium chloride (K-DUR,KLOR-CON) 20 mEq tablet, 1 TAB ORALLY EVERY MORNING DX:ELECTROLYTE DEFICIENCY, Disp: 28 tablet, Rfl: 4    ReadyLance Safety Lancets MISC, AS DIRECTED FOR BLOOD GLUCOSE TESTING 4 TIMES DAILY AND AS NEEDED FORSIGNS/SYMPTOMS OF HI/LOW BS, Disp: 100 each, Rfl: 4    Rhopressa 0 02 % SOLN, , Disp: , Rfl:     timolol (TIMOPTIC) 0 5 % ophthalmic solution, , Disp: , Rfl:     atorvastatin (LIPITOR) 40 mg tablet, Take 2 tablets (80 mg total) by mouth daily at bedtime Hyperlipidemia (Patient not taking: Reported on 9/29/2022), Disp: 30 tablet, Rfl: 0    methylcellulose (Citrucel) 500 mg tablet, Take 1 tablet (500 mg total) by mouth daily (Patient not taking: Reported on 9/29/2022), Disp: 90 each, Rfl: 3    Multiple Vitamins-Minerals (One-A-Day Womens) tablet, Take 1 tablet by mouth every morning Dx: Supplement (Patient not taking: Reported on 9/29/2022), Disp: 30 tablet, Rfl: 5    ACTIVE PROBLEM LIST:  Patient Active Problem List   Diagnosis    Hypertension    Type 2 diabetes mellitus with hyperglycemia, with long-term current use of insulin ([facility])    Leukocytosis    Slow transit constipation    Elevated troponin    Actinic keratosis    Seborrheic keratosis    Screening for skin condition    History of skin cancer    Hypothyroidism    History of heart block    Bilateral carpal tunnel syndrome    Vitamin D deficiency    Pacemaker    GERD without esophagitis    Paroxysmal atrial fibrillation (HCC)    CKD (chronic kidney disease) stage 3, GFR 30-59 ml/min ([facility])    CHF (congestive heart failure) ([facility])       PAST MEDICAL HISTORY:  Past Medical History:   Diagnosis Date    Atrial fibrillation ([facility])     CHF (congestive heart failure) ([facility])     Chronic pain     Diabetes mellitus ([facility] )     Hyperlipidemia     Hypertension     Pacemaker     Pneumonia due to COVID-19 virus 04/14/2020    Squamous cell skin cancer     Type 2 diabetes mellitus with hyperglycemia, with long-term current use of insulin ([facility] ) 12/28/2017       PAST SURGICAL HISTORY:  Past Surgical History:   Procedure Laterality Date    BACK SURGERY      CARDIAC PACEMAKER PLACEMENT      COLONOSCOPY         FAMILY HISTORY:  Family History   Problem Relation Age of Onset    Colon cancer Mother     Heart attack Father        SOCIAL HISTORY:  Social History     Socioeconomic History    Marital status:       Spouse name: Not on file    Number of children: Not on file    Years of education: Not on file    Highest education level: Not on file   Occupational History    Not on file   Tobacco Use    Smoking status: Never Smoker    Smokeless tobacco: Never Used   Vaping Use    Vaping Use: Never used   Substance and Sexual Activity    Alcohol use: Not Currently     Alcohol/week: 0 0 standard drinks    Drug use: No    Sexual activity: Not Currently   Other Topics Concern    Not on file   Social History Narrative    Not on file     Social Determinants of Health     Financial Resource Strain: Not on file   Food Insecurity: No Food Insecurity    Worried About Running Out of Food in the Last Year: Never true    Zaida of Food in the Last Year: Never true   Transportation Needs: No Transportation Needs    Lack of Transportation (Medical): No    Lack of Transportation (Non-Medical): No   Physical Activity: Not on file   Stress: Not on file   Social Connections: Not on file   Intimate Partner Violence: Not on file   Housing Stability: Low Risk     Unable to Pay for Housing in the Last Year: No    Number of Places Lived in the Last Year: 1    Unstable Housing in the Last Year: No       Review of Systems   Respiratory: Negative for shortness of breath  Cardiovascular: Negative for chest pain  Gastrointestinal: Negative for abdominal pain  Objective:  Vitals:    09/29/22 1014   BP: 110/58   BP Location: Left arm   Patient Position: Sitting   Cuff Size: Adult   Pulse: 70   Resp: 16   SpO2: 95%   Weight: 73 1 kg (161 lb 3 2 oz)   Height: 5' 2" (1 575 m)     Body mass index is 29 48 kg/m²  Physical Exam  Vitals and nursing note reviewed  Constitutional:       Appearance: She is well-developed  Cardiovascular:      Rate and Rhythm: Normal rate and regular rhythm  Heart sounds: Normal heart sounds  Pulmonary:      Effort: Pulmonary effort is normal       Breath sounds: Normal breath sounds  Neurological:      Mental Status: She is alert and oriented to person, place, and time  RESULTS:    Recent Results (from the past 1008 hour(s))   HEMOGLOBIN A1C    Collection Time: 09/15/22  7:13 AM   Result Value Ref Range    Hemoglobin A1C 8 0 (H) <5 7 %    eAG, EST AVG Glucose 183 (H) <154 mg/dL   ECG 12 lead    Collection Time: 09/20/22  6:04 PM   Result Value Ref Range    Ventricular Rate 70 BPM    Atrial Rate 56 BPM    MD Interval  ms    QRSD Interval 170 ms    QT Interval 520 ms    QTC Interval 561 ms    P Axis  degrees    QRS Axis -85 degrees    T Wave Axis 78 degrees   CBC and differential    Collection Time: 09/20/22  6:09 PM   Result Value Ref Range    WBC 7 64 4 31 - 10 16 Thousand/uL    RBC 4 87 3 81 - 5 12 Million/uL    Hemoglobin 15 1 11 5 - 15 4 g/dL    Hematocrit 46 0 34 8 - 46 1 %    MCV 95 82 - 98 fL    MCH 31 0 26 8 - 34 3 pg    MCHC 32 8 31 4 - 37 4 g/dL    RDW 14 1 11 6 - 15 1 %    MPV 9 6 8 9 - 12 7 fL    Platelets 400 461 - 986 Thousands/uL    nRBC 0 /100 WBCs    Neutrophils Relative 61 43 - 75 %    Immat GRANS % 0 0 - 2 %    Lymphocytes Relative 27 14 - 44 %    Monocytes Relative 8 4 - 12 %    Eosinophils Relative 3 0 - 6 %    Basophils Relative 1 0 - 1 %    Neutrophils Absolute 4 72 1 85 - 7 62 Thousands/µL    Immature Grans Absolute 0 02 0 00 - 0 20 Thousand/uL    Lymphocytes Absolute 2 03 0 60 - 4 47 Thousands/µL    Monocytes Absolute 0 60 0 17 - 1 22 Thousand/µL    Eosinophils Absolute 0 19 0 00 - 0 61 Thousand/µL    Basophils Absolute 0 08 0 00 - 0 10 Thousands/µL   Comprehensive metabolic panel    Collection Time: 09/20/22  6:09 PM   Result Value Ref Range    Sodium 140 135 - 147 mmol/L    Potassium 3 6 3 5 - 5 3 mmol/L    Chloride 104 96 - 108 mmol/L    CO2 26 21 - 32 mmol/L    ANION GAP 10 4 - 13 mmol/L    BUN 20 5 - 25 mg/dL    Creatinine 1 08 0 60 - 1 30 mg/dL    Glucose 103 65 - 140 mg/dL    Calcium 9 2 8 3 - 10 1 mg/dL    Corrected Calcium 9 9 8 3 - 10 1 mg/dL    AST 47 (H) 5 - 45 U/L    ALT 68 12 - 78 U/L    Alkaline Phosphatase 108 46 - 116 U/L    Total Protein 7 0 6 4 - 8 4 g/dL    Albumin 3 1 (L) 3 5 - 5 0 g/dL    Total Bilirubin 0 39 0 20 - 1 00 mg/dL    eGFR 44 ml/min/1 73sq m   Protime-INR    Collection Time: 09/20/22  6:09 PM   Result Value Ref Range    Protime 15 0 (H) 11 6 - 14 5 seconds    INR 1 21 (H) 0 84 - 1 19   HS Troponin 0hr (reflex protocol)    Collection Time: 09/20/22  6:09 PM   Result Value Ref Range    hs TnI 0hr 24 "Refer to ACS Flowchart"- see link ng/L   Fingerstick Glucose (POCT)    Collection Time: 09/20/22  6:11 PM   Result Value Ref Range    POC Glucose 104 65 - 140 mg/dl   Fingerstick Glucose (POCT)    Collection Time: 09/20/22  6:38 PM   Result Value Ref Range    POC Glucose 116 65 - 140 mg/dl   Fingerstick Glucose (POCT)    Collection Time: 09/20/22  7:15 PM   Result Value Ref Range    POC Glucose 140 65 - 140 mg/dl   Fingerstick Glucose (POCT)    Collection Time: 09/20/22  7:47 PM   Result Value Ref Range    POC Glucose 137 65 - 140 mg/dl   HS Troponin I 2hr    Collection Time: 09/20/22  8:35 PM   Result Value Ref Range    hs TnI 2hr 24 "Refer to ACS Flowchart"- see link ng/L    Delta 2hr hsTnI 0 <20 ng/L   Fingerstick Glucose (POCT)    Collection Time: 09/21/22 12:12 AM   Result Value Ref Range    POC Glucose 194 (H) 65 - 140 mg/dl   Fingerstick Glucose (POCT)    Collection Time: 09/21/22  6:09 AM   Result Value Ref Range    POC Glucose 214 (H) 65 - 140 mg/dl   CBC    Collection Time: 09/21/22 10:28 AM   Result Value Ref Range    WBC 9 98 4 31 - 10 16 Thousand/uL    RBC 4 98 3 81 - 5 12 Million/uL    Hemoglobin 15 6 (H) 11 5 - 15 4 g/dL    Hematocrit 47 8 (H) 34 8 - 46 1 %    MCV 96 82 - 98 fL    MCH 31 3 26 8 - 34 3 pg    MCHC 32 6 31 4 - 37 4 g/dL    RDW 14 0 11 6 - 15 1 %    Platelets 440 083 - 507 Thousands/uL    MPV 9 8 8 9 - 12 7 fL   Basic metabolic panel    Collection Time: 09/21/22 10:28 AM   Result Value Ref Range    Sodium 137 135 - 147 mmol/L    Potassium 4 7 3 5 - 5 3 mmol/L    Chloride 104 96 - 108 mmol/L    CO2 27 21 - 32 mmol/L    ANION GAP 6 4 - 13 mmol/L    BUN 20 5 - 25 mg/dL    Creatinine 1 16 0 60 - 1 30 mg/dL    Glucose 326 (H) 65 - 140 mg/dL    Calcium 9 5 8 3 - 10 1 mg/dL    eGFR 40 ml/min/1 73sq m   Fingerstick Glucose (POCT)    Collection Time: 09/21/22 10:57 AM   Result Value Ref Range    POC Glucose 290 (H) 65 - 140 mg/dl   Fingerstick Glucose (POCT)    Collection Time: 09/21/22  3:38 PM   Result Value Ref Range    POC Glucose 241 (H) 65 - 140 mg/dl   Fingerstick Glucose (POCT)    Collection Time: 09/21/22  8:39 PM   Result Value Ref Range    POC Glucose 329 (H) 65 - 140 mg/dl   Basic metabolic panel    Collection Time: 09/22/22  4:34 AM   Result Value Ref Range    Sodium 139 135 - 147 mmol/L    Potassium 4 1 3 5 - 5 3 mmol/L    Chloride 104 96 - 108 mmol/L    CO2 29 21 - 32 mmol/L    ANION GAP 6 4 - 13 mmol/L    BUN 21 5 - 25 mg/dL    Creatinine 1 20 0 60 - 1 30 mg/dL    Glucose 202 (H) 65 - 140 mg/dL    Calcium 9 2 8 3 - 10 1 mg/dL    eGFR 39 ml/min/1 73sq m   CBC (With Platelets)    Collection Time: 09/22/22  4:34 AM   Result Value Ref Range    WBC 8 75 4 31 - 10 16 Thousand/uL    RBC 4 62 3 81 - 5 12 Million/uL    Hemoglobin 14 3 11 5 - 15 4 g/dL    Hematocrit 44 1 34 8 - 46 1 %    MCV 96 82 - 98 fL    MCH 31 0 26 8 - 34 3 pg    MCHC 32 4 31 4 - 37 4 g/dL    RDW 14 1 11 6 - 15 1 %    Platelets 543 609 - 957 Thousands/uL    MPV 9 9 8 9 - 12 7 fL   TSH, 3rd generation with Free T4 reflex    Collection Time: 09/22/22  4:34 AM   Result Value Ref Range    TSH 3RD GENERATON 1 648 0 450 - 4 500 uIU/mL   Fingerstick Glucose (POCT)    Collection Time: 09/22/22  6:09 AM   Result Value Ref Range    POC Glucose 190 (H) 65 - 140 mg/dl   Fingerstick Glucose (POCT)    Collection Time: 09/22/22 11:01 AM   Result Value Ref Range    POC Glucose 220 (H) 65 - 140 mg/dl   Fingerstick Glucose (POCT)    Collection Time: 09/22/22  3:49 PM   Result Value Ref Range    POC Glucose 256 (H) 65 - 140 mg/dl   Fingerstick Glucose (POCT) Collection Time: 09/22/22  8:59 PM   Result Value Ref Range    POC Glucose 266 (H) 65 - 140 mg/dl   Basic metabolic panel    Collection Time: 09/23/22  5:46 AM   Result Value Ref Range    Sodium 140 135 - 147 mmol/L    Potassium 3 9 3 5 - 5 3 mmol/L    Chloride 102 96 - 108 mmol/L    CO2 30 21 - 32 mmol/L    ANION GAP 8 4 - 13 mmol/L    BUN 25 5 - 25 mg/dL    Creatinine 1 22 0 60 - 1 30 mg/dL    Glucose 184 (H) 65 - 140 mg/dL    Calcium 9 1 8 3 - 10 1 mg/dL    eGFR 38 ml/min/1 73sq m   Lipid Panel with Direct LDL reflex    Collection Time: 09/23/22  5:46 AM   Result Value Ref Range    Cholesterol 137 See Comment mg/dL    Triglycerides 94 See Comment mg/dL    HDL, Direct 39 (L) >=50 mg/dL    LDL Calculated 79 0 - 100 mg/dL   Fingerstick Glucose (POCT)    Collection Time: 09/23/22  6:32 AM   Result Value Ref Range    POC Glucose 176 (H) 65 - 140 mg/dl   Fingerstick Glucose (POCT)    Collection Time: 09/23/22 10:55 AM   Result Value Ref Range    POC Glucose 188 (H) 65 - 140 mg/dl   Echo complete w/ contrast if indicated    Collection Time: 09/23/22 11:18 AM   Result Value Ref Range    A4C EF 63 %    LVIDd 3 50 cm    LVIDS 2 40 cm    IVSd 1 10 cm    LVPWd 1 20 cm    FS 31 28 - 44    E wave deceleration time 211 ms    MV Peak E Cordell 132 cm/s    MV Peak A Cordell 0 48 m/s    LA size 3 9 cm    LA/Ao Ratio 2D 1 39     MV stenosis pressure 1/2 time 62 ms    MV valve area p 1/2 method 3 55     TR Peak Cordell 3 4 m/s    Triscuspid Valve Regurgitation Peak Gradient 45 0 mmHg    Ao root 2 80 cm    Asc Ao 3 1 cm    Tricuspid valve peak regurgitation velocity 3 37 m/s    Left ventricular stroke volume (2D) 29 00 mL    IVS 1 1 cm    LEFT VENTRICLE SYSTOLIC VOLUME (MOD BIPLANE) 2D 21 mL    LV DIASTOLIC VOLUME (MOD BIPLANE) 2D 50 mL    Left Atrium Area-systolic Four Chamber 50 1 cm2    Left Atrium Area-systolic Apical Two Chamber 13 3 cm2    LVSV, 2D 29 mL    LV EF 68     PASP 53 0 mmHg   UA w Reflex to Microscopic w Reflex to Culture Collection Time: 09/23/22 12:50 PM    Specimen: Urine, Other   Result Value Ref Range    Color, UA Yellow     Clarity, UA Clear     Specific [place], UA 1 010 1 003 - 1 030    pH, UA 6 0 4 5, 5 0, 5 5, 6 0, 6 5, 7 0, 7 5, 8 0    Leukocytes, UA Negative Negative    Nitrite, UA Negative Negative    Protein, UA Negative Negative mg/dl    Glucose, UA Negative Negative mg/dl    Ketones, UA Negative Negative mg/dl    Urobilinogen, UA 0 2 0 2, 1 0 E U /dl E U /dl    Bilirubin, UA Negative Negative    Occult Blood, UA Negative Negative   Fingerstick Glucose (POCT)    Collection Time: 09/23/22  3:48 PM   Result Value Ref Range    POC Glucose 252 (H) 65 - 140 mg/dl   Fingerstick Glucose (POCT)    Collection Time: 09/23/22  9:10 PM   Result Value Ref Range    POC Glucose 239 (H) 65 - 140 mg/dl   Basic metabolic panel    Collection Time: 09/24/22  4:25 AM   Result Value Ref Range    Sodium 139 135 - 147 mmol/L    Potassium 3 9 3 5 - 5 3 mmol/L    Chloride 101 96 - 108 mmol/L    CO2 30 21 - 32 mmol/L    ANION GAP 8 4 - 13 mmol/L    BUN 26 (H) 5 - 25 mg/dL    Creatinine 1 30 0 60 - 1 30 mg/dL    Glucose 173 (H) 65 - 140 mg/dL    Calcium 9 3 8 3 - 10 1 mg/dL    eGFR 35 ml/min/1 73sq m   Fingerstick Glucose (POCT)    Collection Time: 09/24/22  6:18 AM   Result Value Ref Range    POC Glucose 170 (H) 65 - 140 mg/dl   Fingerstick Glucose (POCT)    Collection Time: 09/24/22 11:27 AM   Result Value Ref Range    POC Glucose 202 (H) 65 - 140 mg/dl   Fingerstick Glucose (POCT)    Collection Time: 09/24/22  5:11 PM   Result Value Ref Range    POC Glucose 285 (H) 65 - 140 mg/dl   Fingerstick Glucose (POCT)    Collection Time: 09/24/22  9:25 PM   Result Value Ref Range    POC Glucose 159 (H) 65 - 140 mg/dl   Fingerstick Glucose (POCT)    Collection Time: 09/25/22  6:35 AM   Result Value Ref Range    POC Glucose 198 (H) 65 - 140 mg/dl   Basic metabolic panel    Collection Time: 09/25/22  6:41 AM   Result Value Ref Range    Sodium 139 135 - 147 mmol/L    Potassium 3 9 3 5 - 5 3 mmol/L    Chloride 101 96 - 108 mmol/L    CO2 28 21 - 32 mmol/L    ANION GAP 10 4 - 13 mmol/L    BUN 28 (H) 5 - 25 mg/dL    Creatinine 1 31 (H) 0 60 - 1 30 mg/dL    Glucose 195 (H) 65 - 140 mg/dL    Calcium 9 2 8 3 - 10 1 mg/dL    eGFR 35 ml/min/1 73sq m   Fingerstick Glucose (POCT)    Collection Time: 09/25/22 10:47 AM   Result Value Ref Range    POC Glucose 219 (H) 65 - 140 mg/dl   Fingerstick Glucose (POCT)    Collection Time: 09/25/22  4:16 PM   Result Value Ref Range    POC Glucose 257 (H) 65 - 140 mg/dl   Fingerstick Glucose (POCT)    Collection Time: 09/25/22  9:07 PM   Result Value Ref Range    POC Glucose 199 (H) 65 - 140 mg/dl   Basic metabolic panel    Collection Time: 09/26/22  4:36 AM   Result Value Ref Range    Sodium 139 135 - 147 mmol/L    Potassium 4 1 3 5 - 5 3 mmol/L    Chloride 102 96 - 108 mmol/L    CO2 29 21 - 32 mmol/L    ANION GAP 8 4 - 13 mmol/L    BUN 34 (H) 5 - 25 mg/dL    Creatinine 1 43 (H) 0 60 - 1 30 mg/dL    Glucose 167 (H) 65 - 140 mg/dL    Calcium 9 2 8 3 - 10 1 mg/dL    eGFR 31 ml/min/1 73sq m   Fingerstick Glucose (POCT)    Collection Time: 09/26/22  6:46 AM   Result Value Ref Range    POC Glucose 171 (H) 65 - 140 mg/dl   Fingerstick Glucose (POCT)    Collection Time: 09/26/22 10:44 AM   Result Value Ref Range    POC Glucose 227 (H) 65 - 140 mg/dl       This note was created with voice recognition software  Phonic, grammatical and spelling errors may be present within the note as a result